# Patient Record
Sex: MALE | Race: WHITE | Employment: OTHER | URBAN - METROPOLITAN AREA
[De-identification: names, ages, dates, MRNs, and addresses within clinical notes are randomized per-mention and may not be internally consistent; named-entity substitution may affect disease eponyms.]

---

## 2017-04-18 ENCOUNTER — HOSPITAL ENCOUNTER (OUTPATIENT)
Dept: SLEEP CENTER | Age: 62
Discharge: HOME | End: 2017-04-18
Payer: MEDICARE

## 2017-04-18 VITALS
HEIGHT: 68 IN | DIASTOLIC BLOOD PRESSURE: 68 MMHG | RESPIRATION RATE: 20 BRPM | SYSTOLIC BLOOD PRESSURE: 120 MMHG | HEART RATE: 89 BPM | BODY MASS INDEX: 21.22 KG/M2 | WEIGHT: 140 LBS

## 2017-04-18 DIAGNOSIS — G47.33 OSA (OBSTRUCTIVE SLEEP APNEA): Primary | ICD-10-CM

## 2017-04-18 PROCEDURE — 95810 POLYSOM 6/> YRS 4/> PARAM: CPT

## 2017-04-18 ASSESSMENT — SLEEP AND FATIGUE QUESTIONNAIRES
NECK CIRCUMFERENCE (INCHES): 14.17
HOW LIKELY ARE YOU TO NOD OFF OR FALL ASLEEP WHILE SITTING AND READING: 3
HOW LIKELY ARE YOU TO NOD OFF OR FALL ASLEEP WHEN YOU ARE A PASSENGER IN A CAR FOR AN HOUR WITHOUT A BREAK: 2
HOW LIKELY ARE YOU TO NOD OFF OR FALL ASLEEP WHILE LYING DOWN TO REST IN THE AFTERNOON WHEN CIRCUMSTANCES PERMIT: 3
HOW LIKELY ARE YOU TO NOD OFF OR FALL ASLEEP WHILE SITTING INACTIVE IN A PUBLIC PLACE: 0
HOW LIKELY ARE YOU TO NOD OFF OR FALL ASLEEP WHILE WATCHING TV: 3
HOW LIKELY ARE YOU TO NOD OFF OR FALL ASLEEP WHILE SITTING QUIETLY AFTER LUNCH WITHOUT ALCOHOL: 0
HOW LIKELY ARE YOU TO NOD OFF OR FALL ASLEEP WHILE SITTING AND TALKING TO SOMEONE: 0
HOW LIKELY ARE YOU TO NOD OFF OR FALL ASLEEP IN A CAR, WHILE STOPPED FOR A FEW MINUTES IN TRAFFIC: 0
ESS TOTAL SCORE: 11

## 2017-05-18 ENCOUNTER — HOSPITAL ENCOUNTER (OUTPATIENT)
Age: 62
Discharge: HOME | End: 2017-05-18
Payer: MEDICARE

## 2017-05-18 ENCOUNTER — HOSPITAL ENCOUNTER (OUTPATIENT)
Dept: GENERAL RADIOLOGY | Age: 62
Discharge: HOME | End: 2017-05-18
Payer: MEDICARE

## 2017-05-18 DIAGNOSIS — R06.02 SHORTNESS OF BREATH: ICD-10-CM

## 2017-05-18 PROCEDURE — 71020 XR CHEST STANDARD TWO VW: CPT

## 2017-10-13 ENCOUNTER — HOSPITAL ENCOUNTER (OUTPATIENT)
Dept: CT IMAGING | Age: 62
Discharge: HOME | End: 2017-10-13
Payer: MEDICARE

## 2017-10-13 DIAGNOSIS — Z85.118 HISTORY OF CANCER OF UPPER LOBE BRONCHUS OR LUNG: ICD-10-CM

## 2017-10-13 PROCEDURE — 71250 CT THORAX DX C-: CPT

## 2017-10-25 ENCOUNTER — HOSPITAL ENCOUNTER (OUTPATIENT)
Dept: NUCLEAR MEDICINE | Age: 62
Discharge: HOME | End: 2017-10-25
Payer: MEDICARE

## 2017-10-25 DIAGNOSIS — R91.8 PULMONARY NODULES: ICD-10-CM

## 2017-10-25 PROCEDURE — A9552 F18 FDG: HCPCS | Performed by: INTERNAL MEDICINE

## 2017-10-25 PROCEDURE — 78815 PET IMAGE W/CT SKULL-THIGH: CPT

## 2017-10-25 PROCEDURE — 3430000000 HC RX DIAGNOSTIC RADIOPHARMACEUTICAL: Performed by: INTERNAL MEDICINE

## 2017-10-25 RX ADMIN — FLUDEOXYGLUCOSE F 18 15.87 MILLICURIE: 200 INJECTION, SOLUTION INTRAVENOUS at 16:10

## 2017-10-26 RX ORDER — FLUDEOXYGLUCOSE F 18 200 MCI/ML
15.87 INJECTION, SOLUTION INTRAVENOUS
Status: COMPLETED | OUTPATIENT
Start: 2017-10-26 | End: 2017-10-25

## 2017-11-08 ENCOUNTER — APPOINTMENT (OUTPATIENT)
Dept: GENERAL RADIOLOGY | Age: 62
DRG: 200 | End: 2017-11-08
Payer: MEDICARE

## 2017-11-08 ENCOUNTER — HOSPITAL ENCOUNTER (OUTPATIENT)
Dept: CT IMAGING | Age: 62
Discharge: HOME | DRG: 200 | End: 2017-11-08
Payer: MEDICARE

## 2017-11-08 ENCOUNTER — HOSPITAL ENCOUNTER (OUTPATIENT)
Dept: GENERAL RADIOLOGY | Age: 62
Discharge: HOME | DRG: 200 | End: 2017-11-08
Payer: MEDICARE

## 2017-11-08 ENCOUNTER — HOSPITAL ENCOUNTER (INPATIENT)
Age: 62
LOS: 1 days | Discharge: HOME | DRG: 200 | End: 2017-11-10
Attending: EMERGENCY MEDICINE | Admitting: INTERNAL MEDICINE
Payer: MEDICARE

## 2017-11-08 VITALS
TEMPERATURE: 99 F | OXYGEN SATURATION: 100 % | WEIGHT: 129.6 LBS | SYSTOLIC BLOOD PRESSURE: 135 MMHG | RESPIRATION RATE: 24 BRPM | HEART RATE: 72 BPM | DIASTOLIC BLOOD PRESSURE: 92 MMHG | BODY MASS INDEX: 19.64 KG/M2 | HEIGHT: 68 IN

## 2017-11-08 DIAGNOSIS — R91.1 LUNG NODULE: ICD-10-CM

## 2017-11-08 DIAGNOSIS — Z98.890 S/P BIOPSY: ICD-10-CM

## 2017-11-08 DIAGNOSIS — G89.18 POST-OP PAIN: ICD-10-CM

## 2017-11-08 DIAGNOSIS — R06.02 SHORTNESS OF BREATH: ICD-10-CM

## 2017-11-08 DIAGNOSIS — J44.1 COPD EXACERBATION (HCC): ICD-10-CM

## 2017-11-08 DIAGNOSIS — J93.83 OTHER PNEUMOTHORAX: Primary | ICD-10-CM

## 2017-11-08 LAB
ABSOLUTE EOS #: 0.3 K/UL (ref 0–0.4)
ABSOLUTE IMMATURE GRANULOCYTE: NORMAL K/UL (ref 0–0.3)
ABSOLUTE LYMPH #: 1.9 K/UL (ref 1–4.8)
ABSOLUTE MONO #: 1 K/UL (ref 0.1–1.3)
ANION GAP SERPL CALCULATED.3IONS-SCNC: 13 MMOL/L (ref 9–17)
BASOPHILS # BLD: 1 %
BASOPHILS ABSOLUTE: 0.1 K/UL (ref 0–0.2)
BUN BLDV-MCNC: 18 MG/DL (ref 8–23)
BUN/CREAT BLD: ABNORMAL (ref 9–20)
CALCIUM SERPL-MCNC: 9.3 MG/DL (ref 8.6–10.4)
CHLORIDE BLD-SCNC: 100 MMOL/L (ref 98–107)
CO2: 30 MMOL/L (ref 20–31)
CREAT SERPL-MCNC: 0.7 MG/DL (ref 0.7–1.2)
CULTURE: NORMAL
DIFFERENTIAL TYPE: NORMAL
DIRECT EXAM: NORMAL
EOSINOPHILS RELATIVE PERCENT: 3 %
GFR AFRICAN AMERICAN: >60 ML/MIN
GFR NON-AFRICAN AMERICAN: >60 ML/MIN
GFR SERPL CREATININE-BSD FRML MDRD: ABNORMAL ML/MIN/{1.73_M2}
GFR SERPL CREATININE-BSD FRML MDRD: ABNORMAL ML/MIN/{1.73_M2}
GLUCOSE BLD-MCNC: 115 MG/DL (ref 70–99)
HCT VFR BLD CALC: 47 % (ref 41–53)
HEMOGLOBIN: 15.9 G/DL (ref 13.5–17.5)
IMMATURE GRANULOCYTES: NORMAL %
INR BLD: 1
INR BLD: 1
LYMPHOCYTES # BLD: 21 %
Lab: NORMAL
MCH RBC QN AUTO: 29.9 PG (ref 26–34)
MCHC RBC AUTO-ENTMCNC: 33.7 G/DL (ref 31–37)
MCV RBC AUTO: 88.6 FL (ref 80–100)
MONOCYTES # BLD: 11 %
PARTIAL THROMBOPLASTIN TIME: 26.3 SEC (ref 23–31)
PARTIAL THROMBOPLASTIN TIME: 31 SEC (ref 23–31)
PDW BLD-RTO: 14.1 % (ref 11.5–14.9)
PLATELET # BLD: 319 K/UL (ref 150–450)
PLATELET # BLD: 330 K/UL (ref 150–450)
PLATELET ESTIMATE: NORMAL
PMV BLD AUTO: 6.6 FL (ref 6–12)
POTASSIUM SERPL-SCNC: 4.4 MMOL/L (ref 3.7–5.3)
PROTHROMBIN TIME: 10.5 SEC (ref 9.7–12)
PROTHROMBIN TIME: 11.1 SEC (ref 9.7–12)
RBC # BLD: 5.31 M/UL (ref 4.5–5.9)
RBC # BLD: NORMAL 10*6/UL
SEG NEUTROPHILS: 64 %
SEGMENTED NEUTROPHILS ABSOLUTE COUNT: 5.8 K/UL (ref 1.3–9.1)
SODIUM BLD-SCNC: 143 MMOL/L (ref 135–144)
SPECIMEN DESCRIPTION: NORMAL
SPECIMEN DESCRIPTION: NORMAL
STATUS: NORMAL
TROPONIN INTERP: NORMAL
TROPONIN T: <0.03 NG/ML
WBC # BLD: 9.1 K/UL (ref 3.5–11)
WBC # BLD: NORMAL 10*3/UL

## 2017-11-08 PROCEDURE — 71010 XR CHEST LIMITED: CPT

## 2017-11-08 PROCEDURE — 88333 PATH CONSLTJ SURG CYTO XM 1: CPT

## 2017-11-08 PROCEDURE — 85730 THROMBOPLASTIN TIME PARTIAL: CPT

## 2017-11-08 PROCEDURE — 0BBG3ZX EXCISION OF LEFT UPPER LUNG LOBE, PERCUTANEOUS APPROACH, DIAGNOSTIC: ICD-10-PCS | Performed by: RADIOLOGY

## 2017-11-08 PROCEDURE — 85025 COMPLETE CBC W/AUTO DIFF WBC: CPT

## 2017-11-08 PROCEDURE — 87075 CULTR BACTERIA EXCEPT BLOOD: CPT

## 2017-11-08 PROCEDURE — 87205 SMEAR GRAM STAIN: CPT

## 2017-11-08 PROCEDURE — 85610 PROTHROMBIN TIME: CPT

## 2017-11-08 PROCEDURE — 80048 BASIC METABOLIC PNL TOTAL CA: CPT

## 2017-11-08 PROCEDURE — 87116 MYCOBACTERIA CULTURE: CPT

## 2017-11-08 PROCEDURE — 2500000003 HC RX 250 WO HCPCS: Performed by: RADIOLOGY

## 2017-11-08 PROCEDURE — 2580000003 HC RX 258: Performed by: RADIOLOGY

## 2017-11-08 PROCEDURE — 6360000002 HC RX W HCPCS: Performed by: RADIOLOGY

## 2017-11-08 PROCEDURE — 7100000030 HC ASPR PHASE II RECOVERY - FIRST 15 MIN

## 2017-11-08 PROCEDURE — 99285 EMERGENCY DEPT VISIT HI MDM: CPT

## 2017-11-08 PROCEDURE — 88312 SPECIAL STAINS GROUP 1: CPT

## 2017-11-08 PROCEDURE — 85049 AUTOMATED PLATELET COUNT: CPT

## 2017-11-08 PROCEDURE — 32405 CT NEEDLE BIOPSY LUNG PERCUTANEOUS: CPT

## 2017-11-08 PROCEDURE — 36415 COLL VENOUS BLD VENIPUNCTURE: CPT

## 2017-11-08 PROCEDURE — 87102 FUNGUS ISOLATION CULTURE: CPT

## 2017-11-08 PROCEDURE — 87070 CULTURE OTHR SPECIMN AEROBIC: CPT

## 2017-11-08 PROCEDURE — 84484 ASSAY OF TROPONIN QUANT: CPT

## 2017-11-08 PROCEDURE — 87206 SMEAR FLUORESCENT/ACID STAI: CPT

## 2017-11-08 PROCEDURE — 7100000031 HC ASPR PHASE II RECOVERY - ADDTL 15 MIN

## 2017-11-08 PROCEDURE — 87176 TISSUE HOMOGENIZATION CULTR: CPT

## 2017-11-08 PROCEDURE — 88305 TISSUE EXAM BY PATHOLOGIST: CPT

## 2017-11-08 RX ORDER — IPRATROPIUM BROMIDE AND ALBUTEROL SULFATE 2.5; .5 MG/3ML; MG/3ML
2 SOLUTION RESPIRATORY (INHALATION) ONCE
Status: COMPLETED | OUTPATIENT
Start: 2017-11-09 | End: 2017-11-09

## 2017-11-08 RX ORDER — SODIUM CHLORIDE 9 MG/ML
INJECTION, SOLUTION INTRAVENOUS CONTINUOUS
Status: DISCONTINUED | OUTPATIENT
Start: 2017-11-08 | End: 2017-11-11 | Stop reason: HOSPADM

## 2017-11-08 RX ORDER — ACETAMINOPHEN 325 MG/1
650 TABLET ORAL EVERY 4 HOURS PRN
Status: DISCONTINUED | OUTPATIENT
Start: 2017-11-08 | End: 2017-11-11 | Stop reason: HOSPADM

## 2017-11-08 RX ORDER — MIDAZOLAM HYDROCHLORIDE 1 MG/ML
INJECTION INTRAMUSCULAR; INTRAVENOUS
Status: COMPLETED | OUTPATIENT
Start: 2017-11-08 | End: 2017-11-08

## 2017-11-08 RX ORDER — LIDOCAINE HYDROCHLORIDE 10 MG/ML
INJECTION, SOLUTION EPIDURAL; INFILTRATION; INTRACAUDAL; PERINEURAL
Status: COMPLETED | OUTPATIENT
Start: 2017-11-08 | End: 2017-11-08

## 2017-11-08 RX ORDER — FENTANYL CITRATE 50 UG/ML
INJECTION, SOLUTION INTRAMUSCULAR; INTRAVENOUS
Status: COMPLETED | OUTPATIENT
Start: 2017-11-08 | End: 2017-11-08

## 2017-11-08 RX ADMIN — LIDOCAINE HYDROCHLORIDE 2 ML: 10 INJECTION, SOLUTION EPIDURAL; INFILTRATION; INTRACAUDAL; PERINEURAL at 09:33

## 2017-11-08 RX ADMIN — FENTANYL CITRATE 50 MCG: 50 INJECTION INTRAMUSCULAR; INTRAVENOUS at 09:47

## 2017-11-08 RX ADMIN — LIDOCAINE HYDROCHLORIDE 2 ML: 10 INJECTION, SOLUTION EPIDURAL; INFILTRATION; INTRACAUDAL; PERINEURAL at 09:37

## 2017-11-08 RX ADMIN — SODIUM CHLORIDE: 9 INJECTION, SOLUTION INTRAVENOUS at 09:20

## 2017-11-08 RX ADMIN — MIDAZOLAM 1 MG: 1 INJECTION INTRAMUSCULAR; INTRAVENOUS at 09:47

## 2017-11-08 ASSESSMENT — PAIN - FUNCTIONAL ASSESSMENT: PAIN_FUNCTIONAL_ASSESSMENT: 0-10

## 2017-11-08 ASSESSMENT — PAIN SCALES - GENERAL
PAINLEVEL_OUTOF10: 0
PAINLEVEL_OUTOF10: 5
PAINLEVEL_OUTOF10: 0

## 2017-11-08 NOTE — Clinical Note
Patient Class: Inpatient [101]   REQUIRED: Diagnosis: Pneumothorax [831986]   Estimated Length of Stay: Estimated stay of more than 2 midnights   Future Attending Provider: Oliva Quiles [[id number]]   Telemetry Bed Required?: Yes

## 2017-11-08 NOTE — H&P
Occupational History    retired [employer]     Social History Main Topics    Smoking status: Current Every Day Smoker     Packs/day: 0.50     Years: 30.00     Last attempt to quit: 12/1/2012    Smokeless tobacco: Never Used    Alcohol use Yes      Comment: rare    Drug use: No    Sexual activity: Not Asked     Other Topics Concern    None     Social History Narrative    None        REVIEW OF SYSTEMS      Allergies   Allergen Reactions    Imdur [Isosorbide Dinitrate]     Lisinopril Swelling    Statins      Myalgia         Current Outpatient Prescriptions on File Prior to Encounter   Medication Sig Dispense Refill    VENTOLIN  (90 BASE) MCG/ACT inhaler Inhale into the lungs daily      amLODIPine (NORVASC) 10 MG tablet Take 1 tablet by mouth daily      SPIRIVA RESPIMAT 2.5 MCG/ACT AERS Inhale 1 puff into the lungs daily      metoprolol (TOPROL-XL) 100 MG XL tablet Take 100 mg by mouth daily       ASPIR-LOW 81 MG EC tablet Take 1 tablet by mouth daily      albuterol (PROVENTIL) (2.5 MG/3ML) 0.083% nebulizer solution Take by nebulization daily      nitroGLYCERIN (NITROSTAT) 0.4 MG SL tablet Place 0.4 mg under the tongue every 5 minutes as needed for Chest pain      Fluticasone Furoate-Vilanterol (BREO ELLIPTA) 100-25 MCG/INH AEPB Inhale 1 Dose into the lungs daily for 30 days. 1 each 6     No current facility-administered medications on file prior to encounter. Negative except for what is mentioned in the HPI. GENERAL PHYSICAL EXAM     Vitals: /79   Temp 98.4 °F (36.9 °C) (Temporal)   Resp 24   Ht 5' 8\" (1.727 m)   Wt 129 lb 9.6 oz (58.8 kg)   SpO2 96%   BMI 19.71 kg/m²  Body mass index is 19.71 kg/m². GENERAL APPEARANCE:   Devan Arenas is 58 y.o.,  male, not obese, nourished, conscious, alert. Does not appear to be distress or pain at this time. SKIN:  Warm, dry, no cyanosis or jaundice.               HEAD:  Normocephalic, atraumatic, no swelling or tenderness. EYES:  Pupils equal, reactive to light. EARS:  No discharge, no marked hearing loss. NOSE:  No rhinorrhea, epistaxis or septal deformity. THROAT:  Not congested. No ulceration bleeding or discharge. NECK:  No stiffness, trachea central.  No palpable masses or L.N.                 CHEST:  Symmetrical and equal on expansion. HEART:  RRR S1 > S2. No audible murmurs or gallops. LUNGS:  Equal on expansion, slightly diminished breath sounds. No adventitious sounds. ABDOMEN: Soft on palpation. No localized tenderness. No guarding or rigidity. No palpable organomegaly. LYMPHATICS:  No palpable cervical lymphadenopathy. LOCOMOTOR, BACK AND SPINE:  No tenderness or deformities. EXTREMITIES:  Symmetrical, no pedal edema. Alfas sign negative. No discoloration or ulcerations. NEUROLOGIC:  The patient is conscious, alert, oriented, No apparent focal sensory or motor deficits. PROVISIONAL DIAGNOSES / SURGERY:      CT guided Lung Bx. Cancer lung.     Patient Active Problem List    Diagnosis Date Noted    Maxillary sinus polyp 04/01/2016    CAD (coronary artery disease) 03/10/2016    Acute bronchitis 04/15/2015    Tobacco abuse 04/15/2015    Acute exacerbation of chronic obstructive pulmonary disease (COPD) ([facility] [address] 12/30/2014    Lung cancer ([facility] [address] 12/17/2012           GALLO LINDO PA-C on 11/8/2017 at 8:37 AM

## 2017-11-09 ENCOUNTER — APPOINTMENT (OUTPATIENT)
Dept: MRI IMAGING | Age: 62
DRG: 200 | End: 2017-11-09
Payer: MEDICARE

## 2017-11-09 ENCOUNTER — APPOINTMENT (OUTPATIENT)
Dept: GENERAL RADIOLOGY | Age: 62
DRG: 200 | End: 2017-11-09
Payer: MEDICARE

## 2017-11-09 ENCOUNTER — APPOINTMENT (OUTPATIENT)
Dept: NUCLEAR MEDICINE | Age: 62
DRG: 200 | End: 2017-11-09
Payer: MEDICARE

## 2017-11-09 PROBLEM — J93.83 OTHER PNEUMOTHORAX: Status: ACTIVE | Noted: 2017-11-09

## 2017-11-09 PROBLEM — J95.811 PNEUMOTHORAX AFTER BIOPSY: Status: ACTIVE | Noted: 2017-11-09

## 2017-11-09 LAB
ABSOLUTE BANDS #: 0.23 K/UL (ref 0–1)
ABSOLUTE EOS #: 0 K/UL (ref 0–0.4)
ABSOLUTE IMMATURE GRANULOCYTE: ABNORMAL K/UL (ref 0–0.3)
ABSOLUTE LYMPH #: 0.57 K/UL (ref 1–4.8)
ABSOLUTE MONO #: 0.34 K/UL (ref 0.1–1.3)
ANION GAP SERPL CALCULATED.3IONS-SCNC: 10 MMOL/L (ref 9–17)
BANDS: 2 %
BASOPHILS # BLD: 0 %
BASOPHILS ABSOLUTE: 0 K/UL (ref 0–0.2)
BUN BLDV-MCNC: 17 MG/DL (ref 8–23)
BUN/CREAT BLD: ABNORMAL (ref 9–20)
CALCIUM SERPL-MCNC: 8.9 MG/DL (ref 8.6–10.4)
CHLORIDE BLD-SCNC: 105 MMOL/L (ref 98–107)
CO2: 26 MMOL/L (ref 20–31)
CREAT SERPL-MCNC: 0.53 MG/DL (ref 0.7–1.2)
DIFFERENTIAL TYPE: ABNORMAL
DIRECT EXAM: NORMAL
DIRECT EXAM: NORMAL
EOSINOPHILS RELATIVE PERCENT: 0 %
GFR AFRICAN AMERICAN: >60 ML/MIN
GFR NON-AFRICAN AMERICAN: >60 ML/MIN
GFR SERPL CREATININE-BSD FRML MDRD: ABNORMAL ML/MIN/{1.73_M2}
GFR SERPL CREATININE-BSD FRML MDRD: ABNORMAL ML/MIN/{1.73_M2}
GLUCOSE BLD-MCNC: 154 MG/DL (ref 70–99)
HCT VFR BLD CALC: 44 % (ref 41–53)
HEMOGLOBIN: 14.8 G/DL (ref 13.5–17.5)
IMMATURE GRANULOCYTES: ABNORMAL %
LYMPHOCYTES # BLD: 5 %
Lab: NORMAL
MCH RBC QN AUTO: 29.8 PG (ref 26–34)
MCHC RBC AUTO-ENTMCNC: 33.6 G/DL (ref 31–37)
MCV RBC AUTO: 88.6 FL (ref 80–100)
MONOCYTES # BLD: 3 %
MORPHOLOGY: NORMAL
PDW BLD-RTO: 14 % (ref 11.5–14.9)
PLATELET # BLD: 283 K/UL (ref 150–450)
PLATELET ESTIMATE: ABNORMAL
PMV BLD AUTO: 6.5 FL (ref 6–12)
POTASSIUM SERPL-SCNC: 4.7 MMOL/L (ref 3.7–5.3)
RBC # BLD: 4.96 M/UL (ref 4.5–5.9)
RBC # BLD: ABNORMAL 10*6/UL
SEG NEUTROPHILS: 90 %
SEGMENTED NEUTROPHILS ABSOLUTE COUNT: 10.16 K/UL (ref 1.3–9.1)
SODIUM BLD-SCNC: 141 MMOL/L (ref 135–144)
SPECIMEN DESCRIPTION: NORMAL
SPECIMEN DESCRIPTION: NORMAL
STATUS: NORMAL
WBC # BLD: 11.3 K/UL (ref 3.5–11)
WBC # BLD: ABNORMAL 10*3/UL

## 2017-11-09 PROCEDURE — 3430000000 HC RX DIAGNOSTIC RADIOPHARMACEUTICAL: Performed by: FAMILY MEDICINE

## 2017-11-09 PROCEDURE — 2580000003 HC RX 258: Performed by: FAMILY MEDICINE

## 2017-11-09 PROCEDURE — 78597 LUNG PERFUSION DIFFERENTIAL: CPT

## 2017-11-09 PROCEDURE — 6370000000 HC RX 637 (ALT 250 FOR IP): Performed by: EMERGENCY MEDICINE

## 2017-11-09 PROCEDURE — 36415 COLL VENOUS BLD VENIPUNCTURE: CPT

## 2017-11-09 PROCEDURE — A9579 GAD-BASE MR CONTRAST NOS,1ML: HCPCS | Performed by: FAMILY MEDICINE

## 2017-11-09 PROCEDURE — 6360000002 HC RX W HCPCS: Performed by: INTERNAL MEDICINE

## 2017-11-09 PROCEDURE — 80048 BASIC METABOLIC PNL TOTAL CA: CPT

## 2017-11-09 PROCEDURE — 2060000000 HC ICU INTERMEDIATE R&B

## 2017-11-09 PROCEDURE — 6370000000 HC RX 637 (ALT 250 FOR IP): Performed by: NURSE PRACTITIONER

## 2017-11-09 PROCEDURE — 94640 AIRWAY INHALATION TREATMENT: CPT

## 2017-11-09 PROCEDURE — 85025 COMPLETE CBC W/AUTO DIFF WBC: CPT

## 2017-11-09 PROCEDURE — 94664 DEMO&/EVAL PT USE INHALER: CPT

## 2017-11-09 PROCEDURE — 6360000004 HC RX CONTRAST MEDICATION: Performed by: FAMILY MEDICINE

## 2017-11-09 PROCEDURE — 94762 N-INVAS EAR/PLS OXIMTRY CONT: CPT

## 2017-11-09 PROCEDURE — 71010 XR CHEST PORTABLE: CPT

## 2017-11-09 PROCEDURE — 6370000000 HC RX 637 (ALT 250 FOR IP): Performed by: INTERNAL MEDICINE

## 2017-11-09 PROCEDURE — 6360000002 HC RX W HCPCS: Performed by: EMERGENCY MEDICINE

## 2017-11-09 PROCEDURE — 99222 1ST HOSP IP/OBS MODERATE 55: CPT | Performed by: FAMILY MEDICINE

## 2017-11-09 PROCEDURE — 70553 MRI BRAIN STEM W/O & W/DYE: CPT

## 2017-11-09 PROCEDURE — 2580000003 HC RX 258: Performed by: INTERNAL MEDICINE

## 2017-11-09 PROCEDURE — A9540 TC99M MAA: HCPCS | Performed by: FAMILY MEDICINE

## 2017-11-09 RX ORDER — SODIUM CHLORIDE 0.9 % (FLUSH) 0.9 %
10 SYRINGE (ML) INJECTION PRN
Status: DISCONTINUED | OUTPATIENT
Start: 2017-11-09 | End: 2017-11-10 | Stop reason: HOSPADM

## 2017-11-09 RX ORDER — METHYLPREDNISOLONE SODIUM SUCCINATE 40 MG/ML
40 INJECTION, POWDER, LYOPHILIZED, FOR SOLUTION INTRAMUSCULAR; INTRAVENOUS EVERY 8 HOURS
Status: DISCONTINUED | OUTPATIENT
Start: 2017-11-09 | End: 2017-11-10

## 2017-11-09 RX ORDER — ACETAMINOPHEN 325 MG/1
650 TABLET ORAL EVERY 4 HOURS PRN
Status: DISCONTINUED | OUTPATIENT
Start: 2017-11-09 | End: 2017-11-10 | Stop reason: HOSPADM

## 2017-11-09 RX ORDER — METOPROLOL SUCCINATE 100 MG/1
100 TABLET, EXTENDED RELEASE ORAL DAILY
Status: DISCONTINUED | OUTPATIENT
Start: 2017-11-09 | End: 2017-11-10 | Stop reason: HOSPADM

## 2017-11-09 RX ORDER — LEVALBUTEROL 1.25 MG/.5ML
1.25 SOLUTION, CONCENTRATE RESPIRATORY (INHALATION) EVERY 4 HOURS
Status: DISCONTINUED | OUTPATIENT
Start: 2017-11-09 | End: 2017-11-10 | Stop reason: HOSPADM

## 2017-11-09 RX ORDER — DIAPER,BRIEF,INFANT-TODD,DISP
EACH MISCELLANEOUS 2 TIMES DAILY
Status: DISCONTINUED | OUTPATIENT
Start: 2017-11-09 | End: 2017-11-10 | Stop reason: HOSPADM

## 2017-11-09 RX ORDER — METHYLPREDNISOLONE SODIUM SUCCINATE 125 MG/2ML
125 INJECTION, POWDER, LYOPHILIZED, FOR SOLUTION INTRAMUSCULAR; INTRAVENOUS ONCE
Status: COMPLETED | OUTPATIENT
Start: 2017-11-09 | End: 2017-11-09

## 2017-11-09 RX ORDER — AMLODIPINE BESYLATE 10 MG/1
10 TABLET ORAL DAILY
Status: DISCONTINUED | OUTPATIENT
Start: 2017-11-09 | End: 2017-11-10 | Stop reason: HOSPADM

## 2017-11-09 RX ORDER — SODIUM CHLORIDE 9 MG/ML
INJECTION, SOLUTION INTRAVENOUS CONTINUOUS
Status: DISCONTINUED | OUTPATIENT
Start: 2017-11-09 | End: 2017-11-10

## 2017-11-09 RX ORDER — SODIUM CHLORIDE 0.9 % (FLUSH) 0.9 %
10 SYRINGE (ML) INJECTION EVERY 12 HOURS SCHEDULED
Status: DISCONTINUED | OUTPATIENT
Start: 2017-11-09 | End: 2017-11-10 | Stop reason: HOSPADM

## 2017-11-09 RX ORDER — NICOTINE 21 MG/24HR
1 PATCH, TRANSDERMAL 24 HOURS TRANSDERMAL DAILY
Status: DISCONTINUED | OUTPATIENT
Start: 2017-11-09 | End: 2017-11-10 | Stop reason: HOSPADM

## 2017-11-09 RX ORDER — ONDANSETRON 2 MG/ML
4 INJECTION INTRAMUSCULAR; INTRAVENOUS EVERY 6 HOURS PRN
Status: DISCONTINUED | OUTPATIENT
Start: 2017-11-09 | End: 2017-11-10 | Stop reason: HOSPADM

## 2017-11-09 RX ADMIN — HYDROCORTISONE: 1 CREAM TOPICAL at 07:54

## 2017-11-09 RX ADMIN — LEVALBUTEROL 1.25 MG: 1.25 SOLUTION, CONCENTRATE RESPIRATORY (INHALATION) at 05:30

## 2017-11-09 RX ADMIN — SODIUM CHLORIDE: 9 INJECTION, SOLUTION INTRAVENOUS at 18:46

## 2017-11-09 RX ADMIN — METHYLPREDNISOLONE SODIUM SUCCINATE 125 MG: 125 INJECTION, POWDER, FOR SOLUTION INTRAMUSCULAR; INTRAVENOUS at 01:29

## 2017-11-09 RX ADMIN — METHYLPREDNISOLONE SODIUM SUCCINATE 40 MG: 40 INJECTION, POWDER, FOR SOLUTION INTRAMUSCULAR; INTRAVENOUS at 15:56

## 2017-11-09 RX ADMIN — LEVALBUTEROL 1.25 MG: 1.25 SOLUTION, CONCENTRATE RESPIRATORY (INHALATION) at 07:49

## 2017-11-09 RX ADMIN — SODIUM CHLORIDE: 9 INJECTION, SOLUTION INTRAVENOUS at 03:29

## 2017-11-09 RX ADMIN — LEVALBUTEROL 1.25 MG: 1.25 SOLUTION, CONCENTRATE RESPIRATORY (INHALATION) at 20:56

## 2017-11-09 RX ADMIN — HYDROCORTISONE: 1 CREAM TOPICAL at 21:00

## 2017-11-09 RX ADMIN — HYDROCORTISONE: 1 CREAM TOPICAL at 02:51

## 2017-11-09 RX ADMIN — METHYLPREDNISOLONE SODIUM SUCCINATE 40 MG: 40 INJECTION, POWDER, FOR SOLUTION INTRAMUSCULAR; INTRAVENOUS at 07:54

## 2017-11-09 RX ADMIN — METOPROLOL SUCCINATE 100 MG: 100 TABLET, EXTENDED RELEASE ORAL at 07:54

## 2017-11-09 RX ADMIN — TIOTROPIUM BROMIDE 18 MCG: 18 CAPSULE ORAL; RESPIRATORY (INHALATION) at 07:54

## 2017-11-09 RX ADMIN — IPRATROPIUM BROMIDE AND ALBUTEROL SULFATE 2 AMPULE: .5; 3 SOLUTION RESPIRATORY (INHALATION) at 00:07

## 2017-11-09 RX ADMIN — LEVALBUTEROL 1.25 MG: 1.25 SOLUTION, CONCENTRATE RESPIRATORY (INHALATION) at 11:32

## 2017-11-09 RX ADMIN — Medication 5.4 MILLICURIE: at 10:49

## 2017-11-09 RX ADMIN — GADOTERIDOL 13 ML: 279.3 INJECTION, SOLUTION INTRAVENOUS at 15:36

## 2017-11-09 RX ADMIN — LEVALBUTEROL 1.25 MG: 1.25 SOLUTION, CONCENTRATE RESPIRATORY (INHALATION) at 16:22

## 2017-11-09 RX ADMIN — Medication 10 ML: at 10:49

## 2017-11-09 RX ADMIN — AMLODIPINE BESYLATE 10 MG: 10 TABLET ORAL at 07:54

## 2017-11-09 ASSESSMENT — ENCOUNTER SYMPTOMS
COUGH: 0
RHINORRHEA: 0
SHORTNESS OF BREATH: 1
NAUSEA: 0
VOMITING: 0
ABDOMINAL PAIN: 0

## 2017-11-09 ASSESSMENT — PAIN DESCRIPTION - PROGRESSION

## 2017-11-09 ASSESSMENT — PAIN DESCRIPTION - FREQUENCY: FREQUENCY: INTERMITTENT

## 2017-11-09 ASSESSMENT — PAIN DESCRIPTION - PAIN TYPE: TYPE: ACUTE PAIN

## 2017-11-09 ASSESSMENT — PAIN DESCRIPTION - LOCATION: LOCATION: RIB CAGE

## 2017-11-09 ASSESSMENT — PAIN DESCRIPTION - ORIENTATION: ORIENTATION: LEFT

## 2017-11-09 ASSESSMENT — PAIN SCALES - GENERAL
PAINLEVEL_OUTOF10: 2
PAINLEVEL_OUTOF10: 0

## 2017-11-09 ASSESSMENT — PAIN DESCRIPTION - DESCRIPTORS: DESCRIPTORS: DISCOMFORT

## 2017-11-09 NOTE — PROGRESS NOTES
Daily Protein (g): 74-86 gm    Estimated Intake vs Estimated Needs: Intake Less Than Needs    Nutrition Risk Level: Moderate, High    Nutrition Interventions:   Continue current diet, Start ONS  Continued Inpatient Monitoring    Nutrition Evaluation:   · Evaluation: Goals set   · Goals: Adequate nutrition intake or provision    · Monitoring: Meal Intake, Supplement Intake, Diet Tolerance, Pertinent Labs, Weight, Skin Integrity    See Adult Nutrition Doc Flowsheet for more detail. Sofia Cook R.D. LLULY.   Clinical Dietitian  Pager: [phone]

## 2017-11-09 NOTE — PROGRESS NOTES
Pt admitted to [id number]. cardiac monitor, nibp and sao2 applied. Pt oriented to surroundings. Vs obtained. Routine of the unit and call light explained.  Belongings in closet

## 2017-11-09 NOTE — FLOWSHEET NOTE
Visit by Chuckie Mendez     11/09/17 3026   Encounter Summary   Services provided to: Patient   Referral/Consult From: Rounding   Continue Visiting (11-9-17)   Complexity of Encounter Low   Length of Encounter 15 minutes   Routine   Type Follow up   Spiritual/Yazidi   Type Spiritual support   Intervention Prayer

## 2017-11-09 NOTE — ED NOTES
Bedside report given to [facility]. Pt transported on monitor with RN and paramedic.       [facility]  11/09/17 7224

## 2017-11-09 NOTE — H&P
Current Facility-Administered Medications   Medication Dose Route Frequency Provider Last Rate Last Dose    sodium chloride flush 0.9 % injection 10 mL  10 mL Intravenous 2 times per day Paulina Charles MD        sodium chloride flush 0.9 % injection 10 mL  10 mL Intravenous PRN Paulina Charles MD        acetaminophen (TYLENOL) tablet 650 mg  650 mg Oral Q4H PRN Paulina Charles MD        magnesium hydroxide (MILK OF MAGNESIA) 400 MG/5ML suspension 30 mL  30 mL Oral Daily PRN Paulina Charles MD        [facility] injection 4 mg  4 mg Intravenous Q6H PRN Paulina Charles MD        hydrocortisone 1 % cream   Topical BID Rico LEONID Garcia DO        levalbuterol (XOPENEX) nebulizer solution 1.25 mg  1.25 mg Nebulization Q4H Paulina Charles MD   1.25 mg at 11/09/17 1132    0.9 % sodium chloride infusion   Intravenous Continuous Paulina Charles MD 70 mL/hr at 11/09/17 0329      amLODIPine (NORVASC) tablet 10 mg  10 mg Oral Daily Paulina Charles MD   10 mg at 11/09/17 0754    metoprolol succinate (TOPROL XL) extended release tablet 100 mg  100 mg Oral Daily Paulina Charles MD   100 mg at 11/09/17 0754    tiotropium (SPIRIVA) inhalation capsule 18 mcg  1 capsule Inhalation Daily Paulina Charles MD   18 mcg at 11/09/17 0754    methylPREDNISolone sodium (SOLU-MEDROL) injection 40 mg  40 mg Intravenous Q8H Paulina Charles MD   40 mg at 11/09/17 0754    sodium chloride flush 0.9 % injection 10 mL  10 mL Intravenous PRN Carlin Anderson MD   10 mL at 11/09/17 1049     Facility-Administered Medications Ordered in Other Encounters   Medication Dose Route Frequency Provider Last Rate Last Dose    0.9 % sodium chloride infusion   Intravenous Continuous John Padgett MD   Stopped at 11/08/17 1015    acetaminophen (TYLENOL) tablet 650 mg  650 mg Oral Q4H PRN John Padgett MD          PULMONARY  CONSULT NOTE      Date of Admission: 11/8/2017 11:02 PM    Referring Physician: * No referring provider recorded for this case *  PCP: Carlin Anderson MD     History of Present Illness: Fraser Bloch is a 58 y.o. White   male who presents to ED with increasing SOB after undergoing a left lung biopsy earlier in the day. Small left apical PTX found on CXR stable this morning with 1.4 cm separation. Hx COPD on home 02, Hx rt lung ca s/p rt upper lobectomy in 2012 - now with new lung nodules    Problem:  Principal Problem: post procedure PTX    PMH:   Past Medical History:   Diagnosis Date    Arthritis     CAD (coronary artery disease)     Cataract     COPD (chronic obstructive pulmonary disease) ([facility] [address]     Emphysema    Hypertension     Inguinal hernia     right    Lung cancer ([facility] [address] 12/17/2012    Stage IA [place]: robotic RUL    MI (myocardial infarction)     2009    Oxygen dependent     O2 AT HS @2 L PER CANNULA.    SOB (shortness of breath) on exertion     Vascular disease     Wears glasses        PSH:   Past Surgical History:   Procedure Laterality Date    BACK SURGERY  cervical fusion    CATARACT REMOVAL      Rt. eye    COLONOSCOPY      CORONARY ANGIOPLASTY WITH STENT PLACEMENT  2009    had stent x1 before 2009 as well. ( total of 2 stents)    EYE SURGERY      rt. cataract extracted with IOL    INGUINAL HERNIA REPAIR  2012    INGUINAL HERNIA REPAIR Right 8/28/14    Dr Janeth Landeros Right 8/28/14    excision of anal lesion    LOBECTOMY  12/17/12    RUL robotic    NECK SURGERY      OTHER SURGICAL HISTORY  2002    neck fusion    UPPER GASTROINTESTINAL ENDOSCOPY  6/2/14    polyp biopsy, Dr Tl Worley       Allergies:    Allergies   Allergen Reactions    Imdur [Isosorbide Dinitrate]     Lisinopril Swelling    Statins      Myalgia         Home Meds:  Prescriptions Prior to Admission: VENTOLIN  (90 BASE) MCG/ACT inhaler, Inhale into the lungs daily  amLODIPine (NORVASC) 10 MG tablet, Take 1 tablet by mouth daily  SPIRIVA RESPIMAT 2.5 MCG/ACT AERS, Inhale 1 puff into the lungs daily  metoprolol (TOPROL-XL) rate and rhythm, S1, S2 normal, no murmur, click, rub or gallop  Abdomen: soft, non-tender; bowel sounds normal; no masses,  no organomegaly  Extremities: extremities normal, atraumatic, no cyanosis or edema  Skin: skin color, texture, turgor normal. No rashes or lesions  Neurologic: Grossly normal    Recent labs, Imaging studies reviewed    Data ReviewCBC:   Recent Labs      11/08/17   0823  11/08/17   2315  11/09/17   0543   WBC   --   9.1  11.3*   RBC   --   5.31  4.96   HGB   --   15.9  14.8   HCT   --   47.0  44.0   PLT  319  330  283     BMP:   Recent Labs      11/08/17   2315  11/09/17   0543   GLUCOSE  115*  154*   NA  143  141   K  4.4  4.7   BUN  18  17   CREATININE  0.70  0.53*   CALCIUM  9.3  8.9     ABGs: No results for input(s): PHART, PO2ART, LHT6TES, MPV4SRD, BEART, H9KGWDBP, WFM4AFM in the last 72 hours.    PT/INR:  No results found for: PTINR      ASSESSMENT / PLAN:    Dx pneumothorax after lung biopsy       Chronic hypoxic respiratory failure       COPD       Hx lung ca s/p rt upper lobectomy in 2012       New lung nodules  Continue steroids, BD, add nicotine patch  CXR in am    Plan of care discussed with Dr Richard Plata  Electronically signed by Mary Jo Lei on 11/09/17

## 2017-11-09 NOTE — ED PROVIDER NOTES
[facility] ICU  eMERGENCY dEPARTMENT eNCOUnter   Attending Attestation     Pt Name: Nlei Ladd  MRN: [id number]  [place] 1955  Date of evaluation: 11/8/17       Neli Ladd is a 58 y.o. male who presents with Shortness of Breath      MDM:     S/p post lung bx today, small ptx and hemothorax noted , increased sob this evening. Will screen for worsening ptx vs copd exacerbation. Pt is on high flow oxygen and has remained awake and alert. Will have pt closely monitored for resp distress or changes in mental status. Pt not exhibiting any signs of hypercapneic resp failure. Vitals:   Vitals:    11/09/17 0330 11/09/17 0345 11/09/17 0400 11/09/17 0415   BP: 123/88 135/86 112/73 112/73   Pulse: 100 83 82 85   Resp: 23 18 18 20   Temp:   97.8 °F (36.6 °C)    TempSrc:   Oral    SpO2:   100% 100%   Weight:       Height:             I personally evaluated and examined the patient in conjunction with the resident and agree with the assessment, treatment plan, and disposition of the patient as recorded by the resident. I performed a history and physical examination of the patient and discussed management with the resident. I reviewed the residents note and agree with the documented findings and plan of care. Any areas of disagreement are noted on the chart. I was personally present for the key portions of any procedures. I have documented in the chart those procedures where I was not present during the key portions. I have personally reviewed all images and agree with the resident's interpretation. I have reviewed the emergency nurses triage note. I agree with the chief complaint, past medical history, past surgical history, allergies, medications, social and family history as documented unless otherwise noted.     Marlon Fenton MD  Attending Emergency Physician            Marlon Fenton MD  11/09/17 4546

## 2017-11-09 NOTE — PROGRESS NOTES
Breath Sounds: slight wheeze  RR[de-identified] 24  Pulse Sat: [email or web address]  Home Meds: unit dose proventil PRN, spiriva QD.  Albuterol mdi prn    · Bronchodilator assessment at level: 3  · []    Home Level  BRONCHODILATOR ASSESSMENT SCORE  Score 0 1 2 3 4 5   Breath Sounds   []  Patient Baseline []  No Wheeze good aeration [x]  Faint, scattered wheezing, good aeration []  Expiratory Wheezing and or moderately diminished []  Insp/Exp wheeze and/or very diminished []  Insp/Exp and/ or marked distress   Respiratory Rate   []  Patient Baseline []  Less than 20 []  Less than 20 [x]  20-25 []  Greater than 25 []  Greater than 25   Peak flow % of Pred or PB [x]  NA   []  Greater than 90%  []  81-90% []  71-80% []  Less than or equal to 70%  or unable to perform []  Unable due to Respiratory Distress   Dyspnea re []  Patient Baseline []  No SOB []  No SOB [x]  SOB on exertion []  SOB min activity []  At rest/acute   e FEV% Predicted       [x]  NA []  Above 69%  []  Unable []  Above 60-69%  []  Unable []  Above 50-59%  []  Unable []  Above 35-49%  []  Unable []  Less than 35%  []  Unable

## 2017-11-09 NOTE — CARE COORDINATION
CASE MANAGEMENT NOTE:    Admission Date:  11/8/2017 Roxi Pepe is a 58 y.o.  male    Admitted for : Pneumothorax [J93.9]  Pneumothorax [J93.9]    Met with:  Patient    PCP:  Dr. Edy Chowdhury:  Fer       Current Residence/ Living Arrangements:  independently at home             Current Services PTA:  Pulm Rehab    Is patient agreeable to VNS: No    Freedom of choice provided: NA         VNS chosen:  NA    DME:  none    Home Oxygen: yes at night    Nebulizer: No    Supplier: [employer]    Potential Assistance Needed: No    SNF needed: No    Pharm[facility]:  [facility]       Does Patient want to use MEDS to BEDS? No    Family Members/Caregivers that pt would like involved in their care:    Yes    If yes, list name here:  Terrance Dumont    Transportation Provider:  Family                      Discharge Plan:  11/9/17 [facility] Pt is from home with his wife in a one story home he uses no dme and denies need for vns pt has home oxygen from [facility] plan is to discharge to home will follow for needs . //tv               Readmission Risk              Readmission Risk:        14.75       Age 72 or Greater:  0    Admitted from SNF or Requires Paid or Family Care:  0    Currently has CHF,COPD,ARF,CRI,or is on dialysis:  4    Takes more than 5 Prescription Medications:  4    Takes Digoxin,Insulin,Anticoagulants,Narcotics or ASA/Plavix:  [address] [place] [address] in Past 12 Months:  0    On Disability:  3    Patient Considers own Health:  3.75          Electronically signed by:  Durga Singh RN on 11/9/2017 at 1:18 PM

## 2017-11-09 NOTE — CARE COORDINATION
[address] Transitions Interview     2017    Patient: Driscilla Dance Patient : 1955   MRN: [id number]  Reason for Admission: There are no discharge diagnoses documented for the most recent discharge. RARS: Helen  14.75       Patient out of room when writer visited, will continue to follow//JU      Readmission Risk  Patient Active Problem List   Diagnosis    [facility]    Acute exacerbation of chronic obstructive pulmonary disease (COPD) ([facility] [facility]    Acute bronchitis    CAD (coronary artery disease)    Maxillary sinus polyp    Other pneumothorax    Shortness of breath       Inpatient Assessment  Care Transitions Summary    Care Transitions Inpatient Review  Medication Review  Are you able to afford your medications?:  Yes  Housing Review  Are you an active caregiver in your home?:  No  Does the person that you care for see a [facility] PCP?:  No  Social Support  Durable Medical Equipment  Functional Review  Hearing and Vision  Care Transitions Interventions         Follow Up  Future Appointments  Date Time Provider Vanesa Ferreira   2017 12:00 PM [facility]  [address] Maintenance  There are no preventive care reminders to display for this patient.     Krissy Winter RN

## 2017-11-09 NOTE — PLAN OF CARE
Problem: Risk for Impaired Skin Integrity  Goal: Tissue integrity - skin and mucous membranes  Structural intactness and normal physiological function of skin and  mucous membranes. Outcome: Met This Shift  No new skin breakdown noted. Problem: Falls - Risk of  Goal: Absence of falls  Outcome: Met This Shift  No falls this shift.     Problem: Breathing Pattern - Ineffective:  Goal: Ability to achieve and maintain a regular respiratory rate will improve  Ability to achieve and maintain a regular respiratory rate will improve  Outcome: Ongoing  Pt remains on High Flow NC sustain  Saturation

## 2017-11-09 NOTE — FLOWSHEET NOTE
11/09/17 0857   Encounter Summary   Services provided to: Patient   Referral/Consult From: Rounding   Continue Visiting (11/9/17)   Complexity of Encounter Low   Length of Encounter 15 minutes   Routine   Type Initial   Sacraments   Sacrament of Sick-Anointing Anointed  (Fr. Whittaker Lax 11/9/17)

## 2017-11-09 NOTE — PLAN OF CARE
Problem: Risk for Impaired Skin Integrity  Goal: Tissue integrity - skin and mucous membranes  Structural intactness and normal physiological function of skin and  mucous membranes. Outcome: Ongoing  Patient with small puncture site in left side/flank due to lung biopsy 11/8/17. Patient with rash on upper back. No other skin issues noted. Will continue to monitor. Problem: Falls - Risk of  Goal: Absence of falls  Outcome: Ongoing  Patient safety maintained. Bed locked and lowest position. Call light within reach. Hourly rounding performed. No falls or injuries. Will continue to monitor. Problem: Breathing Pattern - Ineffective:  Goal: Ability to achieve and maintain a regular respiratory rate will improve  Ability to achieve and maintain a regular respiratory rate will improve   Outcome: Ongoing  Patient maintaining oxygen saturation above 95% on 2L via NC. No S/S of respiratory difficulty at rest. Noticeable dyspnea with exertion.

## 2017-11-09 NOTE — ED PROVIDER NOTES
abdominal pain, nausea and vomiting. Genitourinary: Negative for difficulty urinating and dysuria. Musculoskeletal: Negative for gait problem and neck pain. Skin: Negative for rash and wound. Neurological: Negative for dizziness and numbness. PHYSICAL EXAM   (up to 7 for level 4, 8 or more for level 5)      INITIAL VITALS:   /83   Pulse 92   Temp 97.9 °F (36.6 °C) (Axillary)   Resp 20   Ht 5' 8\" (1.727 m)   Wt 129 lb (58.5 kg)   SpO2 99%   BMI 19.61 kg/m²     Physical Exam   Constitutional: He is oriented to person, place, and time. He appears well-developed and well-nourished. HENT:   Head: Normocephalic and atraumatic. Mouth/Throat: Oropharynx is clear and moist.   Eyes: Conjunctivae and EOM are normal. Pupils are equal, round, and reactive to light. Neck: Neck supple. Cardiovascular: Regular rhythm, normal heart sounds and intact distal pulses. Tachycardia present. Exam reveals no gallop and no friction rub. No murmur heard. Pulses:       Radial pulses are 2+ on the right side, and 2+ on the left side. Pulmonary/Chest: Breath sounds normal. Accessory muscle usage present. He is in respiratory distress. He has no wheezes. He has no rales. Decreased but equal breath sounds bilaterally   Abdominal: Soft. He exhibits no distension. There is no tenderness. There is no rebound and no guarding. Musculoskeletal: He exhibits no edema, tenderness or deformity. Neurological: He is alert and oriented to person, place, and time. No cranial nerve deficit. He exhibits normal muscle tone. Skin: Skin is warm and dry. No rash noted. He is not diaphoretic.        DIFFERENTIAL  DIAGNOSIS     PLAN (LABS / IMAGING / EKG):  Orders Placed This Encounter   Procedures    XR CHEST (SINGLE VIEW FRONTAL)    CBC Auto Differential    Basic Metabolic Prof    APTT    PT    Troponin    Continuous Pulse Oximetry    Inpatient consult to Primary Care Provider    Inpatient consult to sec   PT   Result Value Ref Range    Protime 10.5 9.7 - 12.0 sec    INR 1.0    Troponin   Result Value Ref Range    Troponin T <0.03 <0.03 ng/mL    Troponin Interp             IMPRESSION: Pneumothorax, respiratory distress, COPD    RADIOLOGY:  Xr Chest (single View Frontal)    Result Date: 11/8/2017  EXAMINATION: SINGLE VIEW OF THE CHEST 11/8/2017 11:32 pm COMPARISON: November 8, 2017 at 1434 hours HISTORY: ORDERING SYSTEM PROVIDED HISTORY: s/p needle bx of lung today, known hemo/pneumothorax, worsening sob TECHNOLOGIST PROVIDED HISTORY: Reason for exam:->s/p needle bx of lung today, known hemo/pneumothorax, worsening sob Ordering Physician Provided Reason for Exam: s/p needle bx of lung today. known hemo/pneumothorax, worsening sob Acuity: Acute Type of Exam: Initial Additional signs and symptoms: Worsening shortness of breath. Needle bx of left lung today. Known hemo/pneumothorax Relevant Medical/Surgical History: Worsening shortness of breath. Needle bx of left lung today. Known hemo/pneumothorax FINDINGS: Tiny left apical pneumothorax with approximately 1.4 cm of pleural separation has not progressed and may be slightly smaller than immediate prior exam, allowing for slight difference in imaging technique angles. The remainder the examination is stable. Cardiomediastinal silhouette is within normal limits. No focal lung consolidation. Minimal blunting of left costophrenic angle. Stable osseous structures. Stable small left apical pneumothorax.   Stable examination     Xr Chest (single View Frontal)    Result Date: 11/8/2017  EXAMINATION: SINGLE VIEW OF THE CHEST 11/8/2017 12:44 pm COMPARISON: 11/08/2017 HISTORY: ORDERING SYSTEM PROVIDED HISTORY: S/P biopsy TECHNOLOGIST PROVIDED HISTORY: Reason for exam:->post lung biopsy per dr James Carson Ordering Physician Provided Reason for Exam: S/p lung biopsy Acuity: Acute Type of Exam: Ongoing Additional signs and symptoms: S/p lung biopsy FINDINGS: There is now a thoracic aorta. Very small left pneumothorax, slightly decreased since prior study. Ct Chest Wo Contrast    Result Date: 10/13/2017  EXAMINATION: CT OF THE CHEST WITHOUT CONTRAST, 10/13/2017 10:38 am TECHNIQUE: CT of the chest was performed without the administration of intravenous contrast. Multiplanar reformatted images are provided for review. Dose modulation, iterative reconstruction, and/or weight based adjustment of the mA/kV was utilized to reduce the radiation dose to as low as reasonably achievable. COMPARISON: September 14, 2015 HISTORY: ORDERING SYSTEM PROVIDED HISTORY: History of cancer of upper lobe bronchus or lung TECHNOLOGIST PROVIDED HISTORY: Ordering Physician Provided Reason for Exam: FOLLOW UP LUNG CANCER, HISTORY OF COPD, EMPHYSEMA Acuity: Chronic Type of Exam: Ongoing FINDINGS: Mediastinum: The heart size is normal.  There is no evidence of pericardial effusion. The aorta demonstrates normal course and caliber. There are no pathologically enlarged intrathoracic or axillary lymph nodes. Lungs/pleura: There is nonspecific debris within the distal trachea. The central airways are patent. The lungs are emphysematous. Scarring within the right lower lobe is stable. Volume loss right upper lobe secondary to partial lung resection. Within the posterior segment left upper lobe there is demonstration of a new nodule with irregular margins measuring 10 x 7.5 mm. Refer to image #52. Scarring medial basilar segment left lower lobe. 1 mm subpleural nodule right lower lobe on image #92 is stable. Upper Abdomen: The upper abdomen demonstrates stable right adrenal nodule measuring 2.3 cm most consistent with adrenal adenoma. Soft Tissues/Bones: No suspicious lytic or blastic osseous lesion. 1. Status post partial right lung resection with no evidence of local recurrence. 2. A new, indeterminate 10 x 7.5 mm pulmonary nodule posterior segment left upper lobe.   Refer to Fleischner Criteria intravenously for moderate sedation monitored under my direction. Total intraservice time of sedation was 45 minutes. The patient's vital signs were monitored throughout the procedure and recorded in the patient's medical record by the nurse. TECHNIQUE AND FINDINGS: Informed consent was obtained after a detailed discussion about the procedure including the risk, benefits, and alternatives. Universal protocol was followed. Axial images were obtained through the lung and a suitable skin site was prepped and draped in sterile fashion. Local anesthesia was achieved with 1% lidocaine. An approximately 7 mm left upper lobe lung nodule was targeted for biopsy. The slightly larger perihilar nodule shown on the recent PET-CT is somewhat ill-defined and surrounded by vessels which would make biopsy much more high risk. With CT for localization a 19 gauge needle was advanced to the periphery of the nodule. CT images show safe tract with the needle tip at the periphery of the nodule. Approximately 4 core biopsy specimens were obtained in coaxial fashion with a 20 gauge Temno needle and submitted to the pathologist for review. Preliminary evaluation confirms adequate specimens for assessment. A needle aspiration biopsy was obtained with the guide needle. Sterile dressing was applied. Postprocedure images show small amount of hemorrhage adjacent to the nodule and regional soft tissue emphysema in the chest wall. No significant pneumothorax is seen. The patient left the department stable condition. Dose modulation, iterative reconstruction, and/or weight based adjustment of the mA/kV was utilized to reduce the radiation dose to as low as reasonably achievable. Successful CT guided core biopsy of a subcentimeter left upper lobe lung nodule.      Pet Ct Skull Base To Mid Thigh    Result Date: 10/26/2017  EXAMINATION: WHOLE BODY PET/CT 10/26/2017 TECHNIQUE: PET-CT The patient received 15.87 mCi of F-18-FDG intravenously, at blood sugar level of 79 mg per deciliter. After approximately 1 hour post injection of the tracer, sequential low-dose CT without intravenous contrast enhancement and positron emission tomography (PET) were performed from base of the skull to the upper thighs. The registered, noncontrast-enhanced CT, non-attenuation-corrected PET and the attenuation-corrected PET images were acquired and reconstructed into axial coronal and sagittal projections for interpretation. Please note that the CT portion of the present study was performed for the attenuation correction and anatomical correlation of the findings on the PET images only. COMPARISON: Comparison is made with previous examination dated December 6, 2012 HISTORY: Patient is a 70-year-old male follow PET-CT for suspicious lung nodules Patient is status post right upper lobectomy in 2013. FINDINGS: Since the previous examination there has been right upper lobectomy. The right upper lobe hypermetabolic nodule has been removed. However, there are two hypermetabolic nodules developed in the left parahilar area. There is a 7.5 mm hypermetabolic nodule developed posteriorly in the left upper lobe showing SUV maximum activity of 2.7. There is apparently 6-7 mm hypermetabolic lymph node in the left gabriel showing SUV maximum activity of 4.1. The second nodule noted cephalad to this in the posterior aspect of the left upper lobe measuring approximately 11 mm with SUV maximum activity of 6.5. The nodules are highly suspicious for metastatic disease process. There are no recurrent hypermetabolic lesions identified in the right lung. Right upper lobectomy with slight volume loss of the right lung noted. There is mild increased activity in the vocalis muscles slightly more on the left side likely from inadequate relaxation. There is no distinct lesion or nodule identified in the vocal cords.  Otherwise, PET/CT demonstrated normal physiological distribution of PROVIDER  IP CONSULT TO PULMONOLOGY    CRITICAL CARE:  None    FINAL IMPRESSION      1. Other pneumothorax    2. Shortness of breath    3.  COPD exacerbation ([facility] [address]          DISPOSITION / PLAN     DISPOSITION Admitted    PATIENT REFERRED TO:  [facility] [place], [address]  [address]  [phone]            DISCHARGE MEDICATIONS:  New Prescriptions    No medications on file       Bobbi Quesada DO  Emergency Medicine Resident    (Please note that portions of this note were completed with a voice recognition program.  Efforts were made to edit the dictations but occasionally words are mis-transcribed.)      Bobbi Quesada DO  11/09/17 8551

## 2017-11-09 NOTE — PROGRESS NOTES
Pt taken off HFNC  Placed on NC 5l/m  100%  Decreased to 4l/m  Tolerating well  Electronically signed by isa dotson RCP on 11/9/2017 at 7:59 AM

## 2017-11-10 ENCOUNTER — APPOINTMENT (OUTPATIENT)
Dept: GENERAL RADIOLOGY | Age: 62
DRG: 200 | End: 2017-11-10
Payer: MEDICARE

## 2017-11-10 VITALS
HEIGHT: 68 IN | RESPIRATION RATE: 16 BRPM | SYSTOLIC BLOOD PRESSURE: 144 MMHG | DIASTOLIC BLOOD PRESSURE: 88 MMHG | HEART RATE: 96 BPM | WEIGHT: 135.58 LBS | OXYGEN SATURATION: 99 % | TEMPERATURE: 97.3 F | BODY MASS INDEX: 20.55 KG/M2

## 2017-11-10 LAB
ABSOLUTE EOS #: 0 K/UL (ref 0–0.4)
ABSOLUTE IMMATURE GRANULOCYTE: ABNORMAL K/UL (ref 0–0.3)
ABSOLUTE LYMPH #: 1.09 K/UL (ref 1–4.8)
ABSOLUTE MONO #: 0.62 K/UL (ref 0.1–1.3)
ANION GAP SERPL CALCULATED.3IONS-SCNC: 12 MMOL/L (ref 9–17)
BASOPHILS # BLD: 0 %
BASOPHILS ABSOLUTE: 0 K/UL (ref 0–0.2)
BUN BLDV-MCNC: 14 MG/DL (ref 8–23)
BUN/CREAT BLD: ABNORMAL (ref 9–20)
CALCIUM SERPL-MCNC: 9.1 MG/DL (ref 8.6–10.4)
CHLORIDE BLD-SCNC: 103 MMOL/L (ref 98–107)
CO2: 26 MMOL/L (ref 20–31)
CREAT SERPL-MCNC: 0.54 MG/DL (ref 0.7–1.2)
DIFFERENTIAL TYPE: ABNORMAL
EOSINOPHILS RELATIVE PERCENT: 0 %
GFR AFRICAN AMERICAN: >60 ML/MIN
GFR NON-AFRICAN AMERICAN: >60 ML/MIN
GFR SERPL CREATININE-BSD FRML MDRD: ABNORMAL ML/MIN/{1.73_M2}
GFR SERPL CREATININE-BSD FRML MDRD: ABNORMAL ML/MIN/{1.73_M2}
GLUCOSE BLD-MCNC: 137 MG/DL (ref 70–99)
HCT VFR BLD CALC: 41.2 % (ref 41–53)
HEMOGLOBIN: 14 G/DL (ref 13.5–17.5)
IMMATURE GRANULOCYTES: ABNORMAL %
LYMPHOCYTES # BLD: 7 %
MCH RBC QN AUTO: 30 PG (ref 26–34)
MCHC RBC AUTO-ENTMCNC: 33.9 G/DL (ref 31–37)
MCV RBC AUTO: 88.6 FL (ref 80–100)
MONOCYTES # BLD: 4 %
MORPHOLOGY: NORMAL
PDW BLD-RTO: 14.3 % (ref 11.5–14.9)
PLATELET # BLD: 278 K/UL (ref 150–450)
PLATELET ESTIMATE: ABNORMAL
PMV BLD AUTO: 6.9 FL (ref 6–12)
POTASSIUM SERPL-SCNC: 4.6 MMOL/L (ref 3.7–5.3)
RBC # BLD: 4.65 M/UL (ref 4.5–5.9)
RBC # BLD: ABNORMAL 10*6/UL
SEG NEUTROPHILS: 89 %
SEGMENTED NEUTROPHILS ABSOLUTE COUNT: 13.79 K/UL (ref 1.3–9.1)
SODIUM BLD-SCNC: 141 MMOL/L (ref 135–144)
WBC # BLD: 15.5 K/UL (ref 3.5–11)
WBC # BLD: ABNORMAL 10*3/UL

## 2017-11-10 PROCEDURE — 80048 BASIC METABOLIC PNL TOTAL CA: CPT

## 2017-11-10 PROCEDURE — 85025 COMPLETE CBC W/AUTO DIFF WBC: CPT

## 2017-11-10 PROCEDURE — 71010 XR CHEST PORTABLE: CPT

## 2017-11-10 PROCEDURE — 6370000000 HC RX 637 (ALT 250 FOR IP): Performed by: INTERNAL MEDICINE

## 2017-11-10 PROCEDURE — 6360000002 HC RX W HCPCS: Performed by: INTERNAL MEDICINE

## 2017-11-10 PROCEDURE — 99232 SBSQ HOSP IP/OBS MODERATE 35: CPT | Performed by: FAMILY MEDICINE

## 2017-11-10 PROCEDURE — 36415 COLL VENOUS BLD VENIPUNCTURE: CPT

## 2017-11-10 RX ORDER — NICOTINE 21 MG/24HR
1 PATCH, TRANSDERMAL 24 HOURS TRANSDERMAL DAILY
Qty: 30 PATCH | Refills: 3 | Status: SHIPPED | OUTPATIENT
Start: 2017-11-10 | End: 2017-11-13

## 2017-11-10 RX ORDER — PREDNISONE 20 MG/1
20 TABLET ORAL DAILY
Status: DISCONTINUED | OUTPATIENT
Start: 2017-11-10 | End: 2017-11-10 | Stop reason: HOSPADM

## 2017-11-10 RX ORDER — PREDNISONE 20 MG/1
20 TABLET ORAL DAILY
Qty: 10 TABLET | Refills: 0 | Status: SHIPPED | OUTPATIENT
Start: 2017-11-10 | End: 2017-11-20

## 2017-11-10 RX ADMIN — AMLODIPINE BESYLATE 10 MG: 10 TABLET ORAL at 10:19

## 2017-11-10 RX ADMIN — METHYLPREDNISOLONE SODIUM SUCCINATE 40 MG: 40 INJECTION, POWDER, FOR SOLUTION INTRAMUSCULAR; INTRAVENOUS at 00:46

## 2017-11-10 RX ADMIN — TIOTROPIUM BROMIDE 18 MCG: 18 CAPSULE ORAL; RESPIRATORY (INHALATION) at 10:14

## 2017-11-10 RX ADMIN — METOPROLOL SUCCINATE 100 MG: 100 TABLET, EXTENDED RELEASE ORAL at 10:14

## 2017-11-10 RX ADMIN — HYDROCORTISONE: 1 CREAM TOPICAL at 09:30

## 2017-11-10 ASSESSMENT — PAIN SCALES - GENERAL
PAINLEVEL_OUTOF10: 0
PAINLEVEL_OUTOF10: 0

## 2017-11-10 NOTE — DISCHARGE INSTR - DIET

## 2017-11-10 NOTE — PROGRESS NOTES
Cheryl Garcia is a 58 y.o. male patient.     Current Facility-Administered Medications   Medication Dose Route Frequency Provider Last Rate Last Dose    sodium chloride flush 0.9 % injection 10 mL  10 mL Intravenous 2 times per day Geoff Haley MD        sodium chloride flush 0.9 % injection 10 mL  10 mL Intravenous PRN Geoff Haley MD        acetaminophen (TYLENOL) tablet 650 mg  650 mg Oral Q4H PRN Geoff Haley MD        magnesium hydroxide (MILK OF MAGNESIA) 400 MG/5ML suspension 30 mL  30 mL Oral Daily PRN Geoff Haley MD        ondansetron [facility]) injection 4 mg  4 mg Intravenous Q6H PRN Geoff Haley MD        hydrocortisone 1 % cream   Topical BID Rico LEONID Garcia DO        levalbuterol (XOPENEX) nebulizer solution 1.25 mg  1.25 mg Nebulization Q4H Geoff Haley MD   1.25 mg at 11/09/17 2056    0.9 % sodium chloride infusion   Intravenous Continuous Geoff Haley MD 70 mL/hr at 11/09/17 1846      amLODIPine (NORVASC) tablet 10 mg  10 mg Oral Daily Geoff Haley MD   10 mg at 11/09/17 0754    metoprolol succinate (TOPROL XL) extended release tablet 100 mg  100 mg Oral Daily Geoff Haley MD   100 mg at 11/09/17 0754    tiotropium (SPIRIVA) inhalation capsule 18 mcg  1 capsule Inhalation Daily Geoff Haley MD   18 mcg at 11/09/17 0754    methylPREDNISolone sodium (SOLU-MEDROL) injection 40 mg  40 mg Intravenous Q8H Geoff Haley MD   40 mg at 11/10/17 0046    sodium chloride flush 0.9 % injection 10 mL  10 mL Intravenous PRN Leelee Garner MD   10 mL at 11/09/17 1049    nicotine (NICODERM CQ) 14 MG/24HR 1 patch  1 patch Transdermal Daily Mona Salazar NP   1 patch at 11/09/17 1338     Facility-Administered Medications Ordered in Other Encounters   Medication Dose Route Frequency Provider Last Rate Last Dose    0.9 % sodium chloride infusion   Intravenous Continuous Berta Tobias MD   Stopped at 11/08/17 1015    acetaminophen (TYLENOL) tablet 650 mg  650 mg Oral Q4H PRN Berta Tobias MD Allergies   Allergen Reactions    Imdur [Isosorbide Dinitrate]     Lisinopril Swelling    Statins      Myalgia       Active Problems:    Lung cancer (HCC)    Acute exacerbation of chronic obstructive pulmonary disease (COPD) (HCC)    CAD (coronary artery disease)    Other pneumothorax    Shortness of breath    Blood pressure 128/86, pulse 85, temperature 98.1 °F (36.7 °C), temperature source Oral, resp. rate 20, height 5' 8\" (1.727 m), weight 135 lb 9.3 oz (61.5 kg), SpO2 99 %. Subjective:  Symptoms:  Stable. (Per wife, SOB with even minimal ambulation). Activity level: Returning to normal.      Objective:  General Appearance:  Comfortable. Vital signs: (most recent): Blood pressure 128/86, pulse 85, temperature 98.1 °F (36.7 °C), temperature source Oral, resp. rate 20, height 5' 8\" (1.727 m), weight 135 lb 9.3 oz (61.5 kg), SpO2 99 %. Vital signs are normal.    Lungs:  Normal effort and normal respiratory rate. There are decreased breath sounds and wheezes. Assessment & Plan   S/p lung biopsy with minimal pneumonthorax and exacerbation COPD - per pulmonary.      Ignacio Roberts MD  11/10/2017

## 2017-11-10 NOTE — DISCHARGE SUMMARY
Date of Admission: 11/9/2017  Date of Discharge: 11/10/2017  Diagnosis:  Pneumothorax  COPD  Lung mass      Shortness of Breath: at baseline  Cough:  +  Sputum: no  Hemoptysis: no  Chest Pain: minimal  Fever: no  Swelling Feet: no  Headache: no  Nausea, Emesis, Abdominal Pain: no  Diarrhea: no     Constipation: no    Events since last visit:     PAST MEDICAL HISTORY:    Smoking:   Scheduled Meds:  Continuous Infusions:  PRN Meds:        PHYSICAL EXAMINATION:  BP (!) 144/88   Pulse 96   Temp 97.3 °F (36.3 °C) (Oral)   Resp 16   Ht 5' 8\" (1.727 m)   Wt 135 lb 9.3 oz (61.5 kg)   SpO2 99%   BMI 20.62 kg/m²     General : Awake, alert, oriented to time, place, and person  Neck  supple, no lymphadenopathy, JVD not raised  Heart  regular rhythm, S1 and S2 normal; no additional sounds heard  Lungs  Air Entry- fair bilaterally; breath sounds : vesicular/ rhonchi/ coarse;   rales/crackles   Abdomen  soft, no tenderness  Upper Extremities  - no cyanosis, mottling; edema : absent  Lower Extremities: no cyanosis, mottling; edema : absent    Current Laboratory, Radiologic, Microbiologic, and Diagnostic studies reviewed  Data ReviewCBC:   Recent Labs      11/08/17   2315  11/09/17   0543  11/10/17   0437   WBC  9.1  11.3*  15.5*   RBC  5.31  4.96  4.65   HGB  15.9  14.8  14.0   HCT  47.0  44.0  41.2   PLT  330  283  278     BMP:   Recent Labs      11/08/17 2315 11/09/17 0543  11/10/17   0437   GLUCOSE  115*  154*  137*   NA  143  141  141   K  4.4  4.7  4.6   BUN  18  17  14   CREATININE  0.70  0.53*  0.54*   CALCIUM  9.3  8.9  9.1     ABGs: No results for input(s): PHART, PO2ART, EMP8JRO, BLS1JBY, BEART, V9YJXAFN, NWX5BIX in the last 72 hours.    PT/INR:  No results found for: PTINR  Path report, MRI head d/w patient  ASSESSMENT / PLAN:  Lung mass- Biopsy non diagnostic; granulomas seen; AFB, fungus stains pending  COPD - BD, O2  PT after needle Bx - stable  OK for DC f/ u in office  meds per med reonciliation  This is late note on patient seen earlier today.     Electronically signed by Dalila Rizvi on 11/10/17

## 2017-11-10 NOTE — FLOWSHEET NOTE
11/10/17 1030   Encounter Summary   Services provided to: Patient   Referral/Consult From: [facility]   Continue Visiting (11/10/17)   Volunteer Visit Yes   Complexity of Encounter Low   Length of Encounter 15 minutes   Routine   Type Follow up   Spiritual/Yazdanism   Type Spiritual support   Intervention Communion   Sacraments   Communion Patient received communion

## 2017-11-11 ENCOUNTER — CARE COORDINATION (OUTPATIENT)
Dept: CASE MANAGEMENT | Age: 62
End: 2017-11-11

## 2017-11-11 NOTE — CARE COORDINATION
[facility] Transitions Initial Follow Up Call    Call within 2 business days of discharge: Yes    Patient: Idris Rubin Patient : 1955   MRN: <[id number]>   There are no discharge diagnoses documented for the most recent discharge. Discharge Date: 11/10/17 RARS: [facility] Risk Score: 14.75     Spoke with: [facility]: [facility]    Non-face-to-face services provided:  Obtained and reviewed discharge summary and/or continuity of care documents      Care Transitions 24 Hour Call    Do you have any ongoing symptoms?:  Yes  Patient-reported symptoms:  Shortness of Breath (Comment: Patient has a chonic condition, but says the SOB is better.)  Do you have a copy of your discharge instructions?:  Yes  Do you have all of your prescriptions and are they filled?:  Yes  Have you scheduled your follow up appointment?:  No (Comment: Patient said he will call on Monday to schedule follow up. Wife will transport.)  Were you discharged with any Home Care or Post Acute Services:  No  Care Transitions Interventions         Follow Up  No future appointments.     Rudi Hilliard RN

## 2017-11-13 ENCOUNTER — TELEPHONE (OUTPATIENT)
Dept: PHARMACY | Facility: CLINIC | Age: 62
End: 2017-11-13

## 2017-11-13 LAB
CULTURE: ABNORMAL
CULTURE: ABNORMAL
DIRECT EXAM: ABNORMAL
DIRECT EXAM: ABNORMAL
Lab: ABNORMAL
SPECIMEN DESCRIPTION: ABNORMAL
STATUS: ABNORMAL
SURGICAL PATHOLOGY REPORT: NORMAL

## 2017-11-13 NOTE — TELEPHONE ENCOUNTER
CLINICAL PHARMACY NOTE - Post-Discharge Transitions of Care (AURY)    Second attempt made to reach patient by telephone for medication review. Spoke with patient - refused complete medication review, states no questions or concerns at this time. Did inquire whether patient filled new prescriptions - patient states filled prednisone but not the nicotine patches, does not plan to use patches.  Will updated med list.     Gudelia Heart, PharmD  Clinical Pharmacy Specialist  O: [phone]  C: [address] [place] [address]  [phone], [address]    =======================================================    For Pharmacy Admin Tracking Only  TCM Call Made?: No  [facility] ([facility] Select Patient?: Yes  Total # of Interventions Recommended: 1 - Updated Order #: 1  Total # Interventions Accepted: 1  Intervention Severity:   - Level 1 Intervention Present?: No   - Level 2 #: 0   - Level 3 #: 0  Outreach Status: Patient Refused  Care Coordinator Outreach to Patient?: Yes  Provider Contacted?: No  Time Spent (min): 5

## 2017-11-15 ENCOUNTER — CARE COORDINATION (OUTPATIENT)
Dept: CASE MANAGEMENT | Age: 62
End: 2017-11-15

## 2017-11-20 ENCOUNTER — CARE COORDINATION (OUTPATIENT)
Dept: CASE MANAGEMENT | Age: 62
End: 2017-11-20

## 2017-11-22 NOTE — CARE COORDINATION
2nd attempt to reach patient for final care transitions call. Unable to LM as VM is not set up. Will try again at later time/date.  -NR

## 2017-11-30 ENCOUNTER — HOSPITAL ENCOUNTER (OUTPATIENT)
Dept: PULMONOLOGY | Age: 62
Setting detail: THERAPIES SERIES
Discharge: HOME | End: 2017-11-30
Payer: MEDICARE

## 2017-11-30 VITALS — WEIGHT: 129.8 LBS | BODY MASS INDEX: 19.74 KG/M2

## 2017-11-30 PROCEDURE — G0424 PULMONARY REHAB W EXER: HCPCS

## 2017-12-11 LAB
CULTURE: NORMAL
CULTURE: NORMAL
Lab: NORMAL
SPECIMEN DESCRIPTION: NORMAL
SPECIMEN DESCRIPTION: NORMAL
STATUS: NORMAL

## 2017-12-25 LAB
CULTURE: NORMAL
CULTURE: NORMAL
DIRECT EXAM: NORMAL
Lab: NORMAL
SPECIMEN DESCRIPTION: NORMAL
SPECIMEN DESCRIPTION: NORMAL
STATUS: NORMAL

## 2018-01-08 ENCOUNTER — HOSPITAL ENCOUNTER (OUTPATIENT)
Dept: PULMONOLOGY | Age: 63
Setting detail: THERAPIES SERIES
Discharge: HOME | End: 2018-01-08
Payer: MEDICARE

## 2018-01-08 VITALS — WEIGHT: 129 LBS | BODY MASS INDEX: 19.61 KG/M2

## 2018-01-08 PROCEDURE — G0424 PULMONARY REHAB W EXER: HCPCS

## 2018-01-15 ENCOUNTER — HOSPITAL ENCOUNTER (OUTPATIENT)
Dept: PULMONOLOGY | Age: 63
Setting detail: THERAPIES SERIES
Discharge: HOME | End: 2018-01-15
Payer: MEDICARE

## 2018-01-15 VITALS — WEIGHT: 130 LBS | BODY MASS INDEX: 19.77 KG/M2

## 2018-01-15 PROCEDURE — G0424 PULMONARY REHAB W EXER: HCPCS

## 2018-01-16 NOTE — PROGRESS NOTES
Patient provided with written material about ***, a medication/s given while in the hospital.  Questions encouraged and answered. Will follow up as needed.

## 2018-01-17 ENCOUNTER — HOSPITAL ENCOUNTER (OUTPATIENT)
Dept: PULMONOLOGY | Age: 63
Setting detail: THERAPIES SERIES
Discharge: HOME | End: 2018-01-17
Payer: MEDICARE

## 2018-01-17 VITALS — BODY MASS INDEX: 19.77 KG/M2 | WEIGHT: 130 LBS

## 2018-01-17 PROCEDURE — G0424 PULMONARY REHAB W EXER: HCPCS

## 2018-01-22 ENCOUNTER — HOSPITAL ENCOUNTER (OUTPATIENT)
Dept: PULMONOLOGY | Age: 63
Setting detail: THERAPIES SERIES
Discharge: HOME | End: 2018-01-22
Payer: MEDICARE

## 2018-01-22 VITALS — WEIGHT: 132 LBS | BODY MASS INDEX: 20.07 KG/M2

## 2018-01-22 PROCEDURE — G0424 PULMONARY REHAB W EXER: HCPCS

## 2018-01-24 ENCOUNTER — HOSPITAL ENCOUNTER (OUTPATIENT)
Dept: PULMONOLOGY | Age: 63
Setting detail: THERAPIES SERIES
Discharge: HOME | End: 2018-01-24
Payer: MEDICARE

## 2018-01-24 VITALS — WEIGHT: 132.4 LBS | BODY MASS INDEX: 20.13 KG/M2

## 2018-01-24 PROCEDURE — G0424 PULMONARY REHAB W EXER: HCPCS

## 2018-02-07 ENCOUNTER — HOSPITAL ENCOUNTER (OUTPATIENT)
Dept: PULMONOLOGY | Age: 63
Setting detail: THERAPIES SERIES
Discharge: HOME | End: 2018-02-07
Payer: MEDICARE

## 2018-02-14 ENCOUNTER — HOSPITAL ENCOUNTER (OUTPATIENT)
Dept: PULMONOLOGY | Age: 63
Setting detail: THERAPIES SERIES
Discharge: HOME | End: 2018-02-14
Payer: MEDICARE

## 2018-02-14 VITALS — BODY MASS INDEX: 20.07 KG/M2 | WEIGHT: 132 LBS

## 2018-02-14 PROCEDURE — G0424 PULMONARY REHAB W EXER: HCPCS

## 2018-02-21 ENCOUNTER — HOSPITAL ENCOUNTER (OUTPATIENT)
Dept: PULMONOLOGY | Age: 63
Setting detail: THERAPIES SERIES
Discharge: HOME | End: 2018-02-21
Payer: MEDICARE

## 2018-02-21 VITALS — WEIGHT: 130.4 LBS | BODY MASS INDEX: 19.83 KG/M2

## 2018-02-21 PROCEDURE — G0424 PULMONARY REHAB W EXER: HCPCS

## 2018-02-22 NOTE — PROGRESS NOTES
Consistent with most recent physician signed TP- Pulmonary Rehabilitation Medical Review  w / Dr. Nany Saldivar was completed on 02/22/18.

## 2018-03-01 ENCOUNTER — HOSPITAL ENCOUNTER (OUTPATIENT)
Dept: CT IMAGING | Age: 63
Discharge: HOME | End: 2018-03-03
Payer: MEDICARE

## 2018-03-01 DIAGNOSIS — R91.8 PULMONARY NODULES: ICD-10-CM

## 2018-03-01 PROCEDURE — 71250 CT THORAX DX C-: CPT

## 2018-03-12 ENCOUNTER — HOSPITAL ENCOUNTER (OUTPATIENT)
Age: 63
Discharge: HOME | End: 2018-03-12
Payer: MEDICARE

## 2018-03-12 LAB
ALLEN TEST: ABNORMAL
CARBOXYHEMOGLOBIN: 3.3 % (ref 0–5)
FIO2: ABNORMAL
HCO3 ARTERIAL: 27.8 MMOL/L (ref 22–26)
METHEMOGLOBIN: 0.3 % (ref 0–1.9)
MODE: ABNORMAL
NEGATIVE BASE EXCESS, ART: ABNORMAL MMOL/L (ref 0–2)
NOTIFICATION TIME: ABNORMAL
NOTIFICATION: ABNORMAL
O2 DEVICE/FLOW/%: ABNORMAL
O2 SAT, ARTERIAL: 92.9 % (ref 95–98)
OXYHEMOGLOBIN: ABNORMAL % (ref 95–98)
PATIENT TEMP: 37
PCO2 ARTERIAL: 43.2 MMHG (ref 35–45)
PCO2, ART, TEMP ADJ: ABNORMAL (ref 35–45)
PEEP/CPAP: ABNORMAL
PH ARTERIAL: 7.42 (ref 7.35–7.45)
PH, ART, TEMP ADJ: ABNORMAL (ref 7.35–7.45)
PO2 ARTERIAL: 66.3 MMHG (ref 80–100)
PO2, ART, TEMP ADJ: ABNORMAL MMHG (ref 80–100)
POSITIVE BASE EXCESS, ART: 3.3 MMOL/L (ref 0–2)
PSV: ABNORMAL
PT. POSITION: ABNORMAL
RESPIRATORY RATE: 20
SAMPLE SITE: ABNORMAL
SET RATE: ABNORMAL
TEXT FOR RESPIRATORY: ABNORMAL
TOTAL HB: ABNORMAL G/DL (ref 12–16)
TOTAL RATE: ABNORMAL
VT: ABNORMAL

## 2018-03-12 PROCEDURE — 36600 WITHDRAWAL OF ARTERIAL BLOOD: CPT

## 2018-03-12 PROCEDURE — 82805 BLOOD GASES W/O2 SATURATION: CPT

## 2018-03-14 ENCOUNTER — HOSPITAL ENCOUNTER (OUTPATIENT)
Dept: PULMONOLOGY | Age: 63
Setting detail: THERAPIES SERIES
Discharge: HOME | End: 2018-03-14
Payer: MEDICARE

## 2018-11-15 PROBLEM — R35.0 BENIGN PROSTATIC HYPERPLASIA WITH URINARY FREQUENCY: Status: ACTIVE | Noted: 2018-11-15

## 2018-11-15 PROBLEM — N40.1 BENIGN PROSTATIC HYPERPLASIA WITH URINARY FREQUENCY: Status: ACTIVE | Noted: 2018-11-15

## 2018-12-03 ENCOUNTER — HOSPITAL ENCOUNTER (INPATIENT)
Age: 63
LOS: 4 days | Discharge: HOSPICE | DRG: 191 | End: 2018-12-07
Attending: EMERGENCY MEDICINE | Admitting: FAMILY MEDICINE
Payer: MEDICARE

## 2018-12-03 ENCOUNTER — APPOINTMENT (OUTPATIENT)
Dept: GENERAL RADIOLOGY | Age: 63
DRG: 191 | End: 2018-12-03
Payer: MEDICARE

## 2018-12-03 DIAGNOSIS — J44.1 COPD EXACERBATION (HCC): Primary | ICD-10-CM

## 2018-12-03 LAB
ABSOLUTE EOS #: 0 K/UL (ref 0–0.4)
ABSOLUTE IMMATURE GRANULOCYTE: ABNORMAL K/UL (ref 0–0.3)
ABSOLUTE LYMPH #: 1 K/UL (ref 1–4.8)
ABSOLUTE MONO #: 1.1 K/UL (ref 0.1–1.3)
ANION GAP SERPL CALCULATED.3IONS-SCNC: 13 MMOL/L (ref 9–17)
BASOPHILS # BLD: 0 % (ref 0–2)
BASOPHILS ABSOLUTE: 0.1 K/UL (ref 0–0.2)
BNP INTERPRETATION: NORMAL
BUN BLDV-MCNC: 32 MG/DL (ref 8–23)
BUN/CREAT BLD: ABNORMAL (ref 9–20)
CALCIUM SERPL-MCNC: 9.8 MG/DL (ref 8.6–10.4)
CHLORIDE BLD-SCNC: 89 MMOL/L (ref 98–107)
CO2: 34 MMOL/L (ref 20–31)
CREAT SERPL-MCNC: 0.73 MG/DL (ref 0.7–1.2)
DIFFERENTIAL TYPE: ABNORMAL
EKG ATRIAL RATE: 107 BPM
EKG P AXIS: 87 DEGREES
EKG P-R INTERVAL: 110 MS
EKG Q-T INTERVAL: 320 MS
EKG QRS DURATION: 84 MS
EKG QTC CALCULATION (BAZETT): 427 MS
EKG R AXIS: 48 DEGREES
EKG T AXIS: 84 DEGREES
EKG VENTRICULAR RATE: 107 BPM
EOSINOPHILS RELATIVE PERCENT: 0 % (ref 0–4)
GFR AFRICAN AMERICAN: >60 ML/MIN
GFR NON-AFRICAN AMERICAN: >60 ML/MIN
GFR SERPL CREATININE-BSD FRML MDRD: ABNORMAL ML/MIN/{1.73_M2}
GFR SERPL CREATININE-BSD FRML MDRD: ABNORMAL ML/MIN/{1.73_M2}
GLUCOSE BLD-MCNC: 94 MG/DL (ref 70–99)
HCT VFR BLD CALC: 38.4 % (ref 41–53)
HEMOGLOBIN: 12.8 G/DL (ref 13.5–17.5)
IMMATURE GRANULOCYTES: ABNORMAL %
INR BLD: 1
LV EF: 65 %
LVEF MODALITY: NORMAL
LYMPHOCYTES # BLD: 8 % (ref 24–44)
MCH RBC QN AUTO: 29.3 PG (ref 26–34)
MCHC RBC AUTO-ENTMCNC: 33.3 G/DL (ref 31–37)
MCV RBC AUTO: 88.1 FL (ref 80–100)
MONOCYTES # BLD: 9 % (ref 1–7)
MYOGLOBIN: 207 NG/ML (ref 28–72)
MYOGLOBIN: 237 NG/ML (ref 28–72)
NRBC AUTOMATED: ABNORMAL PER 100 WBC
PARTIAL THROMBOPLASTIN TIME: 26.4 SEC (ref 23–31)
PDW BLD-RTO: 13.1 % (ref 11.5–14.9)
PLATELET # BLD: 248 K/UL (ref 150–450)
PLATELET ESTIMATE: ABNORMAL
PMV BLD AUTO: 6.7 FL (ref 6–12)
POTASSIUM SERPL-SCNC: 4.6 MMOL/L (ref 3.7–5.3)
PRO-BNP: 264 PG/ML
PROTHROMBIN TIME: 10.9 SEC (ref 9.7–12)
RBC # BLD: 4.36 M/UL (ref 4.5–5.9)
RBC # BLD: ABNORMAL 10*6/UL
SEG NEUTROPHILS: 83 % (ref 36–66)
SEGMENTED NEUTROPHILS ABSOLUTE COUNT: 10.4 K/UL (ref 1.3–9.1)
SODIUM BLD-SCNC: 136 MMOL/L (ref 135–144)
TROPONIN INTERP: ABNORMAL
TROPONIN INTERP: ABNORMAL
TROPONIN T: <0.03 NG/ML
TROPONIN T: <0.03 NG/ML
WBC # BLD: 12.6 K/UL (ref 3.5–11)
WBC # BLD: ABNORMAL 10*3/UL

## 2018-12-03 PROCEDURE — 2700000000 HC OXYGEN THERAPY PER DAY

## 2018-12-03 PROCEDURE — 94761 N-INVAS EAR/PLS OXIMETRY MLT: CPT

## 2018-12-03 PROCEDURE — 6370000000 HC RX 637 (ALT 250 FOR IP): Performed by: INTERNAL MEDICINE

## 2018-12-03 PROCEDURE — 6360000002 HC RX W HCPCS: Performed by: FAMILY MEDICINE

## 2018-12-03 PROCEDURE — 93005 ELECTROCARDIOGRAM TRACING: CPT

## 2018-12-03 PROCEDURE — 2580000003 HC RX 258: Performed by: FAMILY MEDICINE

## 2018-12-03 PROCEDURE — 6360000002 HC RX W HCPCS: Performed by: INTERNAL MEDICINE

## 2018-12-03 PROCEDURE — 84484 ASSAY OF TROPONIN QUANT: CPT

## 2018-12-03 PROCEDURE — 93306 TTE W/DOPPLER COMPLETE: CPT

## 2018-12-03 PROCEDURE — 6360000002 HC RX W HCPCS: Performed by: EMERGENCY MEDICINE

## 2018-12-03 PROCEDURE — 6370000000 HC RX 637 (ALT 250 FOR IP): Performed by: EMERGENCY MEDICINE

## 2018-12-03 PROCEDURE — 6370000000 HC RX 637 (ALT 250 FOR IP): Performed by: FAMILY MEDICINE

## 2018-12-03 PROCEDURE — 85610 PROTHROMBIN TIME: CPT

## 2018-12-03 PROCEDURE — 71045 X-RAY EXAM CHEST 1 VIEW: CPT

## 2018-12-03 PROCEDURE — 85025 COMPLETE CBC W/AUTO DIFF WBC: CPT

## 2018-12-03 PROCEDURE — 1200000000 HC SEMI PRIVATE

## 2018-12-03 PROCEDURE — 94664 DEMO&/EVAL PT USE INHALER: CPT

## 2018-12-03 PROCEDURE — 99285 EMERGENCY DEPT VISIT HI MDM: CPT

## 2018-12-03 PROCEDURE — 36415 COLL VENOUS BLD VENIPUNCTURE: CPT

## 2018-12-03 PROCEDURE — 83880 ASSAY OF NATRIURETIC PEPTIDE: CPT

## 2018-12-03 PROCEDURE — 80048 BASIC METABOLIC PNL TOTAL CA: CPT

## 2018-12-03 PROCEDURE — 94640 AIRWAY INHALATION TREATMENT: CPT

## 2018-12-03 PROCEDURE — 83874 ASSAY OF MYOGLOBIN: CPT

## 2018-12-03 PROCEDURE — 85730 THROMBOPLASTIN TIME PARTIAL: CPT

## 2018-12-03 PROCEDURE — 2500000003 HC RX 250 WO HCPCS: Performed by: FAMILY MEDICINE

## 2018-12-03 RX ORDER — ASPIRIN 81 MG/1
324 TABLET, CHEWABLE ORAL ONCE
Status: COMPLETED | OUTPATIENT
Start: 2018-12-03 | End: 2018-12-03

## 2018-12-03 RX ORDER — ASPIRIN 81 MG/1
81 TABLET ORAL DAILY
Status: DISCONTINUED | OUTPATIENT
Start: 2018-12-03 | End: 2018-12-07 | Stop reason: HOSPADM

## 2018-12-03 RX ORDER — SENNA AND DOCUSATE SODIUM 50; 8.6 MG/1; MG/1
1 TABLET, FILM COATED ORAL NIGHTLY
Status: DISCONTINUED | OUTPATIENT
Start: 2018-12-03 | End: 2018-12-07 | Stop reason: HOSPADM

## 2018-12-03 RX ORDER — TAMSULOSIN HYDROCHLORIDE 0.4 MG/1
0.4 CAPSULE ORAL DAILY
Status: DISCONTINUED | OUTPATIENT
Start: 2018-12-03 | End: 2018-12-07 | Stop reason: HOSPADM

## 2018-12-03 RX ORDER — SODIUM CHLORIDE 0.9 % (FLUSH) 0.9 %
10 SYRINGE (ML) INJECTION EVERY 12 HOURS SCHEDULED
Status: DISCONTINUED | OUTPATIENT
Start: 2018-12-03 | End: 2018-12-07 | Stop reason: HOSPADM

## 2018-12-03 RX ORDER — ALBUTEROL SULFATE 2.5 MG/3ML
2.5 SOLUTION RESPIRATORY (INHALATION)
Status: DISCONTINUED | OUTPATIENT
Start: 2018-12-03 | End: 2018-12-03

## 2018-12-03 RX ORDER — BUSPIRONE HYDROCHLORIDE 10 MG/1
10 TABLET ORAL DAILY
COMMUNITY

## 2018-12-03 RX ORDER — MORPHINE SULFATE 10 MG/5ML
6 SOLUTION ORAL
Status: DISCONTINUED | OUTPATIENT
Start: 2018-12-03 | End: 2018-12-07 | Stop reason: HOSPADM

## 2018-12-03 RX ORDER — IPRATROPIUM BROMIDE AND ALBUTEROL SULFATE 2.5; .5 MG/3ML; MG/3ML
1 SOLUTION RESPIRATORY (INHALATION)
Status: DISCONTINUED | OUTPATIENT
Start: 2018-12-03 | End: 2018-12-03

## 2018-12-03 RX ORDER — METOPROLOL TARTRATE 5 MG/5ML
5 INJECTION INTRAVENOUS ONCE
Status: COMPLETED | OUTPATIENT
Start: 2018-12-03 | End: 2018-12-03

## 2018-12-03 RX ORDER — METHYLPREDNISOLONE SODIUM SUCCINATE 125 MG/2ML
125 INJECTION, POWDER, LYOPHILIZED, FOR SOLUTION INTRAMUSCULAR; INTRAVENOUS ONCE
Status: COMPLETED | OUTPATIENT
Start: 2018-12-03 | End: 2018-12-03

## 2018-12-03 RX ORDER — FUROSEMIDE 10 MG/ML
20 INJECTION INTRAMUSCULAR; INTRAVENOUS ONCE
Status: COMPLETED | OUTPATIENT
Start: 2018-12-03 | End: 2018-12-03

## 2018-12-03 RX ORDER — MORPHINE SULFATE 10 MG/5ML
SOLUTION ORAL
COMMUNITY

## 2018-12-03 RX ORDER — SODIUM CHLORIDE 0.9 % (FLUSH) 0.9 %
10 SYRINGE (ML) INJECTION PRN
Status: DISCONTINUED | OUTPATIENT
Start: 2018-12-03 | End: 2018-12-07 | Stop reason: HOSPADM

## 2018-12-03 RX ORDER — BUSPIRONE HYDROCHLORIDE 5 MG/1
10 TABLET ORAL DAILY
Status: DISCONTINUED | OUTPATIENT
Start: 2018-12-03 | End: 2018-12-07 | Stop reason: HOSPADM

## 2018-12-03 RX ORDER — NITROGLYCERIN 0.4 MG/1
0.4 TABLET SUBLINGUAL EVERY 5 MIN PRN
Status: DISCONTINUED | OUTPATIENT
Start: 2018-12-03 | End: 2018-12-07 | Stop reason: HOSPADM

## 2018-12-03 RX ORDER — METOPROLOL TARTRATE 5 MG/5ML
5 INJECTION INTRAVENOUS EVERY 4 HOURS PRN
Status: DISCONTINUED | OUTPATIENT
Start: 2018-12-03 | End: 2018-12-07 | Stop reason: HOSPADM

## 2018-12-03 RX ORDER — GUAIFENESIN 600 MG/1
600 TABLET, EXTENDED RELEASE ORAL 2 TIMES DAILY
Status: DISCONTINUED | OUTPATIENT
Start: 2018-12-03 | End: 2018-12-07 | Stop reason: HOSPADM

## 2018-12-03 RX ORDER — ALBUTEROL SULFATE 2.5 MG/3ML
2.5 SOLUTION RESPIRATORY (INHALATION) 4 TIMES DAILY
Status: DISCONTINUED | OUTPATIENT
Start: 2018-12-03 | End: 2018-12-07 | Stop reason: HOSPADM

## 2018-12-03 RX ORDER — METOPROLOL SUCCINATE 100 MG/1
100 TABLET, EXTENDED RELEASE ORAL DAILY
Status: DISCONTINUED | OUTPATIENT
Start: 2018-12-03 | End: 2018-12-07 | Stop reason: HOSPADM

## 2018-12-03 RX ORDER — ONDANSETRON 2 MG/ML
4 INJECTION INTRAMUSCULAR; INTRAVENOUS EVERY 6 HOURS PRN
Status: DISCONTINUED | OUTPATIENT
Start: 2018-12-03 | End: 2018-12-07 | Stop reason: HOSPADM

## 2018-12-03 RX ORDER — ALBUTEROL SULFATE 2.5 MG/3ML
2.5 SOLUTION RESPIRATORY (INHALATION)
Status: DISCONTINUED | OUTPATIENT
Start: 2018-12-03 | End: 2018-12-07 | Stop reason: HOSPADM

## 2018-12-03 RX ORDER — METHYLPREDNISOLONE SODIUM SUCCINATE 125 MG/2ML
60 INJECTION, POWDER, LYOPHILIZED, FOR SOLUTION INTRAMUSCULAR; INTRAVENOUS DAILY
Status: DISCONTINUED | OUTPATIENT
Start: 2018-12-03 | End: 2018-12-03

## 2018-12-03 RX ORDER — AMLODIPINE BESYLATE 10 MG/1
10 TABLET ORAL DAILY
Status: DISCONTINUED | OUTPATIENT
Start: 2018-12-03 | End: 2018-12-04

## 2018-12-03 RX ORDER — METHYLPREDNISOLONE SODIUM SUCCINATE 125 MG/2ML
60 INJECTION, POWDER, LYOPHILIZED, FOR SOLUTION INTRAMUSCULAR; INTRAVENOUS EVERY 8 HOURS
Status: DISCONTINUED | OUTPATIENT
Start: 2018-12-03 | End: 2018-12-07 | Stop reason: HOSPADM

## 2018-12-03 RX ORDER — ESCITALOPRAM OXALATE 20 MG/1
20 TABLET ORAL DAILY
Status: DISCONTINUED | OUTPATIENT
Start: 2018-12-03 | End: 2018-12-07 | Stop reason: HOSPADM

## 2018-12-03 RX ADMIN — METHYLPREDNISOLONE SODIUM SUCCINATE 125 MG: 125 INJECTION, POWDER, FOR SOLUTION INTRAMUSCULAR; INTRAVENOUS at 10:42

## 2018-12-03 RX ADMIN — SENNOSIDES AND DOCUSATE SODIUM 1 TABLET: 8.6; 5 TABLET ORAL at 20:20

## 2018-12-03 RX ADMIN — AMLODIPINE BESYLATE 10 MG: 10 TABLET ORAL at 12:54

## 2018-12-03 RX ADMIN — ENOXAPARIN SODIUM 30 MG: 30 INJECTION SUBCUTANEOUS at 12:53

## 2018-12-03 RX ADMIN — MORPHINE SULFATE 6 MG: 10 SOLUTION ORAL at 15:34

## 2018-12-03 RX ADMIN — Medication 10 ML: at 13:08

## 2018-12-03 RX ADMIN — ESCITALOPRAM OXALATE 20 MG: 20 TABLET ORAL at 12:54

## 2018-12-03 RX ADMIN — METOPROLOL SUCCINATE 100 MG: 100 TABLET, EXTENDED RELEASE ORAL at 12:55

## 2018-12-03 RX ADMIN — MORPHINE SULFATE 6 MG: 10 SOLUTION ORAL at 20:51

## 2018-12-03 RX ADMIN — BUSPIRONE HYDROCHLORIDE 10 MG: 5 TABLET ORAL at 12:54

## 2018-12-03 RX ADMIN — METHYLPREDNISOLONE SODIUM SUCCINATE 60 MG: 125 INJECTION, POWDER, FOR SOLUTION INTRAMUSCULAR; INTRAVENOUS at 17:35

## 2018-12-03 RX ADMIN — IPRATROPIUM BROMIDE AND ALBUTEROL SULFATE 1 AMPULE: .5; 3 SOLUTION RESPIRATORY (INHALATION) at 08:59

## 2018-12-03 RX ADMIN — ASPIRIN 81 MG 324 MG: 81 TABLET ORAL at 09:10

## 2018-12-03 RX ADMIN — ALBUTEROL SULFATE 2.5 MG: 2.5 SOLUTION RESPIRATORY (INHALATION) at 20:26

## 2018-12-03 RX ADMIN — Medication 10 ML: at 20:20

## 2018-12-03 RX ADMIN — TIOTROPIUM BROMIDE 18 MCG: 18 CAPSULE ORAL; RESPIRATORY (INHALATION) at 12:54

## 2018-12-03 RX ADMIN — TAMSULOSIN HYDROCHLORIDE 0.4 MG: 0.4 CAPSULE ORAL at 12:54

## 2018-12-03 RX ADMIN — ALBUTEROL SULFATE 2.5 MG: 2.5 SOLUTION RESPIRATORY (INHALATION) at 15:34

## 2018-12-03 RX ADMIN — FUROSEMIDE 20 MG: 10 INJECTION, SOLUTION INTRAMUSCULAR; INTRAVENOUS at 12:54

## 2018-12-03 RX ADMIN — MORPHINE SULFATE 6 MG: 10 SOLUTION ORAL at 12:49

## 2018-12-03 RX ADMIN — GUAIFENESIN 600 MG: 600 TABLET, EXTENDED RELEASE ORAL at 12:54

## 2018-12-03 RX ADMIN — GUAIFENESIN 600 MG: 600 TABLET, EXTENDED RELEASE ORAL at 20:20

## 2018-12-03 RX ADMIN — ALBUTEROL SULFATE 2.5 MG: 2.5 SOLUTION RESPIRATORY (INHALATION) at 11:40

## 2018-12-03 RX ADMIN — MOMETASONE FUROATE AND FORMOTEROL FUMARATE DIHYDRATE 2 PUFF: 200; 5 AEROSOL RESPIRATORY (INHALATION) at 20:20

## 2018-12-03 RX ADMIN — MOMETASONE FUROATE AND FORMOTEROL FUMARATE DIHYDRATE 2 PUFF: 200; 5 AEROSOL RESPIRATORY (INHALATION) at 12:54

## 2018-12-03 RX ADMIN — METOPROLOL TARTRATE 5 MG: 1 INJECTION, SOLUTION INTRAVENOUS at 15:34

## 2018-12-03 RX ADMIN — IPRATROPIUM BROMIDE AND ALBUTEROL SULFATE 1 AMPULE: .5; 3 SOLUTION RESPIRATORY (INHALATION) at 08:50

## 2018-12-03 ASSESSMENT — PAIN DESCRIPTION - LOCATION: LOCATION: FOOT

## 2018-12-03 ASSESSMENT — ENCOUNTER SYMPTOMS
WHEEZING: 0
CHEST TIGHTNESS: 0
EYE PAIN: 0
DIARRHEA: 0
EYE DISCHARGE: 0
VOMITING: 0
SINUS PRESSURE: 0
ABDOMINAL PAIN: 0
COUGH: 0
BLOOD IN STOOL: 0
FACIAL SWELLING: 0
BACK PAIN: 0
CONSTIPATION: 0
SORE THROAT: 0
TROUBLE SWALLOWING: 0
COLOR CHANGE: 0
SHORTNESS OF BREATH: 1
RHINORRHEA: 0
EYE REDNESS: 0
NAUSEA: 0

## 2018-12-03 ASSESSMENT — PAIN SCALES - GENERAL
PAINLEVEL_OUTOF10: 8
PAINLEVEL_OUTOF10: 2
PAINLEVEL_OUTOF10: 2
PAINLEVEL_OUTOF10: 4
PAINLEVEL_OUTOF10: 9
PAINLEVEL_OUTOF10: 0
PAINLEVEL_OUTOF10: 1

## 2018-12-03 ASSESSMENT — PAIN DESCRIPTION - DESCRIPTORS: DESCRIPTORS: ACHING

## 2018-12-03 ASSESSMENT — PAIN DESCRIPTION - PAIN TYPE: TYPE: ACUTE PAIN

## 2018-12-03 ASSESSMENT — PAIN DESCRIPTION - ORIENTATION: ORIENTATION: RIGHT;LEFT

## 2018-12-03 NOTE — ED NOTES
Pt arrived in ED with c/o SOB. Pt states he has COPD and is on oxygen 2L at home continuously. Pt states his SOB has worsened. Pt states he is currently receiving palliative care for his COPD and receives a dose of morphine every 3 hours. Pt is alert and oriented x4. Pt is tachypneic. Pt pausing in between sentences. Pt's oxygen saturation at 95% on 2L.       Shelly Wen RN  12/03/18 3416

## 2018-12-03 NOTE — ED PROVIDER NOTES
seizures, syncope, speech difficulty, weakness, numbness and headaches. Psychiatric/Behavioral: Negative for confusion, decreased concentration, hallucinations, self-injury, sleep disturbance and suicidal ideas. PAST MEDICAL HISTORY     Past Medical History:   Diagnosis Date    Arthritis     CAD (coronary artery disease)     Cataract     [facility]ronic [facility] pulmonary [facility]) ([facility] [address]     Emphysema    Hypertension     Inguinal hernia     right    Lung cancer ([facility] [address] 12/17/2012    Stage IA [place]: robotic RUL    MI (myocardial infarction) ([facility] [address]     2009    Oxygen dependent     O2 AT HS @2 L PER CANNULA.    SOB (shortness of breath) on exertion     Vascular disease     Wears glasses        SURGICAL HISTORY       Past Surgical History:   Procedure Laterality Date    BACK SURGERY  cervical fusion    CATARACT REMOVAL      Rt. eye    COLONOSCOPY      CORONARY ANGIOPLASTY WITH STENT PLACEMENT  2009    had stent x1 before 2009 as well. ( total of 2 stents)    EYE SURGERY      rt. cataract extracted with IOL    INGUINAL HERNIA REPAIR  2012    INGUINAL HERNIA REPAIR Right 8/28/14    Dr Mary Aviles Right 8/28/14    excision of anal lesion    LOBECTOMY  12/17/12    RUL robotic   [address] OTHER SURGICAL HISTORY  2002    neck fusion    UPPER GASTROINTESTINAL ENDOSCOPY  6/2/14    polyp biopsy, Dr Giselle Mckenzie       Previous Medications    ALBUTEROL (PROVENTIL) (2.5 MG/3ML) 0.083% NEBULIZER SOLUTION    Take by nebulization daily    AMLODIPINE (NORVASC) 10 MG TABLET    Take 1 tablet by mouth daily    ASPIR-LOW 81 MG EC TABLET    Take 1 tablet by mouth daily    BUSPIRONE (BUSPAR) 10 MG TABLET    Take 10 mg by mouth daily    ESCITALOPRAM (LEXAPRO) 10 MG TABLET    Take 1 tablet by mouth daily    FLUTICASONE FUROATE-VILANTEROL (BREO ELLIPTA) 100-25 MCG/INH AEPB    Inhale 1 Dose into the lungs daily for 30 days.     METOPROLOL (TOPROL-XL) 100 MG XL TABLET    Take 100 mg by mouth daily     MORPHINE 10 MG/5ML SOLUTION    Take by mouth every 2 hours as needed for Pain. Romesharon Luison NITROGLYCERIN (NITROSTAT) 0.4 MG SL TABLET    Place 0.4 mg under the tongue every 5 minutes as needed for Chest pain    SENNOSIDES-DOCUSATE SODIUM (SENNA S PO)    Take by mouth    SPIRIVA RESPIMAT 2.5 MCG/ACT AERS    Inhale 1 puff into the lungs daily    TAMSULOSIN (FLOMAX) 0.4 MG CAPSULE    Take 1 capsule by mouth daily    VENTOLIN  (90 BASE) MCG/ACT INHALER    Inhale into the lungs daily       ALLERGIES     is allergic to imdur [isosorbide dinitrate]; lisinopril; and statins. FAMILY HISTORY     [unfilled]     SOCIAL HISTORY      reports that he quit smoking about 6 years ago. He has a 15.00 pack-year smoking history. He has never used smokeless tobacco. He reports that he drinks alcohol. He reports that he does not use drugs. PHYSICAL EXAM     INITIAL VITALS: /82   Pulse 111   Temp 98.8 °F (37.1 °C) (Temporal)   Resp 26   Ht 5' 8\" (1.727 m)   Wt 110 lb (49.9 kg)   SpO2 95%   BMI 16.73 kg/m²      Physical Exam   Constitutional: He is oriented to person, place, and time. He appears well-developed and well-nourished. No distress. HENT:   Head: Normocephalic and atraumatic. Eyes: Pupils are equal, round, and reactive to light. Conjunctivae and EOM are normal. Right eye exhibits no discharge. Left eye exhibits no discharge. No scleral icterus. Cardiovascular: Normal rate, regular rhythm and normal heart sounds. Exam reveals no gallop and no friction rub. No murmur heard. Pulmonary/Chest: Accessory muscle usage present. Tachypnea noted. He is in respiratory distress. He has decreased breath sounds. He has wheezes. He has no rhonchi. He has no rales. He exhibits no tenderness. Abdominal: Soft. Bowel sounds are normal. He exhibits no distension and no mass. There is no tenderness. There is no rebound and no guarding.    Musculoskeletal: Normal range of

## 2018-12-03 NOTE — SIGNIFICANT EVENT
Jose Maria Miranda MD   Physician   Cardiology   Progress Notes   Signed   Encounter Date:  5/31/2018               Signed        Expand All Collapse All            []Hide copied text    []Ewa for details      Hallie Terry     Date of visit: 5/31/2018                                             YOB: 1955  Age: 58 y.o. MRN: [id number]  _______________________      Patient Active Problem List   Diagnosis    Other hyperlipidemia - intolerant to Statins due to leg myalgias    Rheumatoid arteritis    Cancer ([facility] [facility]    COPD (chronic obstructive pulmonary disease) ([facility] SEVERE    Pulmonary emphysema (HCC)    Fatigue    PVD (peripheral vascular disease) ([facility])    Old MI (myocardial infarction)    Angina pectoris ([facility])    Benign essential hypertension    Abnormal electrocardiography    Secondary pulmonary hypertension    Shortness of breath WORSENING    Coronary artery disease involving native coronary artery of native heart without angina pectoris    Presence of drug coated stent in anterior descending branch of left coronary artery Mar 2016    Presence of drug coated stent in left circumflex coronary artery 2009    Maxillary sinus polyp      _______________________        Allergies   Allergen Reactions    Atorvastatin         Other reaction(s): Myopathy or muscle pain    Statins-Hmg-Coa Reductase Inhibitors         Myalgia      _______________________  Current Medications          Current Outpatient Prescriptions   Medication Sig Dispense Refill    albuterol (ACCUNEB) 0.63 mg/3 mL nebulizer solution as needed.        amLODIPine (NORVASC) 10 mg tablet Take 1 tablet (10 mg total) by mouth once daily.  90 tablet 2    aspirin 81 mg Take 81 mg by mouth daily.        escitalopram (LEXAPRO) 10 mg tablet Take 10 mg by mouth as needed.        fluticasone-vilanterol (BREO ELLIPTA) 100-25 mcg/dose blister with device daily.           _______________________  Social History               Social History    Marital status:        Spouse name: N/A    Number of children: N/A    Years of education: N/A          Occupational History    Not on file.           Social History Main Topics    Smoking status: Former Smoker    Smokeless tobacco: Never Used    Alcohol use No    Drug use: No    Sexual activity: Not on file           Other Topics Concern    Caffeine Use Yes          Social History Narrative    No narrative on file         _______________________  Review of Systems  Review of Systems   Cardiovascular: Positive for dyspnea on exertion. Negative for chest pain and leg swelling. Tightness in both arm pain in the left arm   Respiratory: Positive for shortness of breath. Skin: Negative for itching and rash. Musculoskeletal: Positive for arthritis, back pain, joint pain and stiffness. Gastrointestinal: Positive for nausea. Negative for bloating and abdominal pain.    Neurological: Negative for excessive daytime sleepiness, dizziness and headaches.      CARDIOVASCULAR: Please review HPI.  _______________________  Physical Examination  CONSTITUTIONAL: cooperative, alert and oriented, well developed, well nourished, in no acute distress, wife Aileen Larios here - chronically ill appearing male  SKIN: warm and dry to touch  NECK: no JVD  CHEST: clear but decreased air entry   CARDIAC: regular rhythm, no S3 or S4, no significant audible murmur  PERIPHERAL PULSES: bilateral radial pulses full and equal   EXTREMITIES and BACK: no cyanosis present, no clubbing present, no edema present  PSYCHIATRIC: appropriate mood, memory and judgement  NEUROLOGICAL: able to move all 4 extremities  _______________________  VITAL SIGNS:  /84 (BP Site: Left Arm, BP Postition: Sitting, BP CUFF SIZE: S (7-9 inches))   Pulse 84   Ht 170.2 cm (5' 7\")   Wt 59 kg (130 lb)   BMI 20.36 kg/m²   _______________________       Orders Placed This Encounter   Medications    escitalopram (LEXAPRO) 10 mg tablet       Sig: Take 10 mg by mouth as needed.  nitroglycerin (NITROSTAT) 0.4 MG SL tablet       Sig: Place 1 tablet (0.4 mg total) under the tongue as needed for chest pain.       Dispense:  25 tablet       Refill:  3           Medications Discontinued During This Encounter   Medication Reason    nitroglycerin (NITROSTAT) 0.4 MG SL tablet Reorder      _______________________  IMPRESSIONS/PLAN       Encounter Diagnoses   Name Primary?  Angina pectoris ([facility] [address] Yes    Coronary artery disease involving native coronary artery of native heart without angina pectoris      Presence of drug coated stent in anterior descending branch of left coronary artery Mar 2016      Presence of drug coated stent in left circumflex coronary artery 2009      Shortness of breath WORSENING        Angina pectoris, Atherosclerotic coronary artery disease, MOMO of LCX in 2009, MOMO of prox LAD Mar 2016 (stopped Brilinta in Aug 2017 due to cost issues), LVEF 55%, mild pulmonary hypertension, Hyperlipidemia - intolerant to Statins due to leg myalgias, severe exertional dyspnea due to severe Advanced Emphysema & COPD - uses Home O2/NC always at night and PRN daytime - quit cigarette smoking July 2017, cancer of lung and status post right upper lobectomy - sees Dr Sarah Butterfield ([facility]), chronic dry cough, chronic fatigue, other problems as charted.  No CHF or syncope.        EKG today - NSR, Short LA interval, RsR', no acte EKG changes.     Advised to use NTG SL as needed and refilled today. Gets Headache only with Isosorbide and no true allergy.     Can not afford Ranexa - stating \"already in a Donut hole\".    Also reminded to seek immediate medical attention if any worsening cardiac symptoms. May need cardiac cath at that time.     Continue all current cardiac meds.     No plans for cardiac cath or stress test (due to bad lungs) - at this time.     Overall POOR PROGNOSIS.   Patient &

## 2018-12-04 LAB
ANION GAP SERPL CALCULATED.3IONS-SCNC: 7 MMOL/L (ref 9–17)
BUN BLDV-MCNC: 19 MG/DL (ref 8–23)
BUN/CREAT BLD: ABNORMAL (ref 9–20)
CALCIUM SERPL-MCNC: 9.4 MG/DL (ref 8.6–10.4)
CHLORIDE BLD-SCNC: 92 MMOL/L (ref 98–107)
CO2: 37 MMOL/L (ref 20–31)
CREAT SERPL-MCNC: 0.63 MG/DL (ref 0.7–1.2)
GFR AFRICAN AMERICAN: >60 ML/MIN
GFR NON-AFRICAN AMERICAN: >60 ML/MIN
GFR SERPL CREATININE-BSD FRML MDRD: ABNORMAL ML/MIN/{1.73_M2}
GFR SERPL CREATININE-BSD FRML MDRD: ABNORMAL ML/MIN/{1.73_M2}
GLUCOSE BLD-MCNC: 141 MG/DL (ref 70–99)
POTASSIUM SERPL-SCNC: 3.8 MMOL/L (ref 3.7–5.3)
POTASSIUM SERPL-SCNC: 5.6 MMOL/L (ref 3.7–5.3)
SODIUM BLD-SCNC: 136 MMOL/L (ref 135–144)

## 2018-12-04 PROCEDURE — 84132 ASSAY OF SERUM POTASSIUM: CPT

## 2018-12-04 PROCEDURE — 6360000002 HC RX W HCPCS: Performed by: INTERNAL MEDICINE

## 2018-12-04 PROCEDURE — 99222 1ST HOSP IP/OBS MODERATE 55: CPT | Performed by: FAMILY MEDICINE

## 2018-12-04 PROCEDURE — 94761 N-INVAS EAR/PLS OXIMETRY MLT: CPT

## 2018-12-04 PROCEDURE — 1200000000 HC SEMI PRIVATE

## 2018-12-04 PROCEDURE — 2700000000 HC OXYGEN THERAPY PER DAY

## 2018-12-04 PROCEDURE — 6360000002 HC RX W HCPCS: Performed by: FAMILY MEDICINE

## 2018-12-04 PROCEDURE — 6370000000 HC RX 637 (ALT 250 FOR IP): Performed by: INTERNAL MEDICINE

## 2018-12-04 PROCEDURE — 6370000000 HC RX 637 (ALT 250 FOR IP): Performed by: FAMILY MEDICINE

## 2018-12-04 PROCEDURE — 94640 AIRWAY INHALATION TREATMENT: CPT

## 2018-12-04 PROCEDURE — 36415 COLL VENOUS BLD VENIPUNCTURE: CPT

## 2018-12-04 PROCEDURE — 80048 BASIC METABOLIC PNL TOTAL CA: CPT

## 2018-12-04 PROCEDURE — 2580000003 HC RX 258: Performed by: FAMILY MEDICINE

## 2018-12-04 RX ORDER — SCOLOPAMINE TRANSDERMAL SYSTEM 1 MG/1
1 PATCH, EXTENDED RELEASE TRANSDERMAL
Status: DISCONTINUED | OUTPATIENT
Start: 2018-12-04 | End: 2018-12-07 | Stop reason: HOSPADM

## 2018-12-04 RX ADMIN — BUSPIRONE HYDROCHLORIDE 10 MG: 5 TABLET ORAL at 08:01

## 2018-12-04 RX ADMIN — MOMETASONE FUROATE AND FORMOTEROL FUMARATE DIHYDRATE 2 PUFF: 200; 5 AEROSOL RESPIRATORY (INHALATION) at 08:00

## 2018-12-04 RX ADMIN — ALBUTEROL SULFATE 2.5 MG: 2.5 SOLUTION RESPIRATORY (INHALATION) at 19:14

## 2018-12-04 RX ADMIN — MOMETASONE FUROATE AND FORMOTEROL FUMARATE DIHYDRATE 2 PUFF: 200; 5 AEROSOL RESPIRATORY (INHALATION) at 19:59

## 2018-12-04 RX ADMIN — ALBUTEROL SULFATE 2.5 MG: 2.5 SOLUTION RESPIRATORY (INHALATION) at 12:19

## 2018-12-04 RX ADMIN — GUAIFENESIN 600 MG: 600 TABLET, EXTENDED RELEASE ORAL at 19:59

## 2018-12-04 RX ADMIN — AMLODIPINE BESYLATE 10 MG: 10 TABLET ORAL at 08:01

## 2018-12-04 RX ADMIN — Medication 10 ML: at 08:07

## 2018-12-04 RX ADMIN — DILTIAZEM HYDROCHLORIDE 30 MG: 30 TABLET, FILM COATED ORAL at 20:00

## 2018-12-04 RX ADMIN — METHYLPREDNISOLONE SODIUM SUCCINATE 60 MG: 125 INJECTION, POWDER, FOR SOLUTION INTRAMUSCULAR; INTRAVENOUS at 08:02

## 2018-12-04 RX ADMIN — TIOTROPIUM BROMIDE 18 MCG: 18 CAPSULE ORAL; RESPIRATORY (INHALATION) at 08:00

## 2018-12-04 RX ADMIN — ASPIRIN 81 MG: 81 TABLET, COATED ORAL at 08:01

## 2018-12-04 RX ADMIN — GUAIFENESIN 600 MG: 600 TABLET, EXTENDED RELEASE ORAL at 08:01

## 2018-12-04 RX ADMIN — ESCITALOPRAM OXALATE 20 MG: 20 TABLET ORAL at 08:01

## 2018-12-04 RX ADMIN — ALBUTEROL SULFATE 2.5 MG: 2.5 SOLUTION RESPIRATORY (INHALATION) at 15:55

## 2018-12-04 RX ADMIN — METHYLPREDNISOLONE SODIUM SUCCINATE 60 MG: 125 INJECTION, POWDER, FOR SOLUTION INTRAMUSCULAR; INTRAVENOUS at 02:05

## 2018-12-04 RX ADMIN — MORPHINE SULFATE 6 MG: 10 SOLUTION ORAL at 19:59

## 2018-12-04 RX ADMIN — DILTIAZEM HYDROCHLORIDE 30 MG: 30 TABLET, FILM COATED ORAL at 15:15

## 2018-12-04 RX ADMIN — MORPHINE SULFATE 6 MG: 10 SOLUTION ORAL at 04:51

## 2018-12-04 RX ADMIN — ALBUTEROL SULFATE 2.5 MG: 2.5 SOLUTION RESPIRATORY (INHALATION) at 07:09

## 2018-12-04 RX ADMIN — SENNOSIDES AND DOCUSATE SODIUM 1 TABLET: 8.6; 5 TABLET ORAL at 19:59

## 2018-12-04 RX ADMIN — Medication 10 ML: at 19:59

## 2018-12-04 RX ADMIN — METOPROLOL SUCCINATE 100 MG: 100 TABLET, EXTENDED RELEASE ORAL at 08:01

## 2018-12-04 RX ADMIN — TAMSULOSIN HYDROCHLORIDE 0.4 MG: 0.4 CAPSULE ORAL at 08:01

## 2018-12-04 RX ADMIN — ENOXAPARIN SODIUM 30 MG: 30 INJECTION SUBCUTANEOUS at 08:00

## 2018-12-04 RX ADMIN — Medication 10 ML: at 17:21

## 2018-12-04 RX ADMIN — METHYLPREDNISOLONE SODIUM SUCCINATE 60 MG: 125 INJECTION, POWDER, FOR SOLUTION INTRAMUSCULAR; INTRAVENOUS at 17:21

## 2018-12-04 RX ADMIN — MORPHINE SULFATE 6 MG: 10 SOLUTION ORAL at 08:00

## 2018-12-04 ASSESSMENT — PAIN DESCRIPTION - PAIN TYPE: TYPE: ACUTE PAIN

## 2018-12-04 ASSESSMENT — ENCOUNTER SYMPTOMS
SINUS PRESSURE: 0
RHINORRHEA: 0
CONSTIPATION: 1
VOMITING: 0
ABDOMINAL PAIN: 0
CHEST TIGHTNESS: 1
NAUSEA: 0
SHORTNESS OF BREATH: 1
EYE DISCHARGE: 0
TROUBLE SWALLOWING: 0
COUGH: 1
WHEEZING: 1
EYE ITCHING: 0
DIARRHEA: 0

## 2018-12-04 ASSESSMENT — PAIN SCALES - GENERAL
PAINLEVEL_OUTOF10: 4
PAINLEVEL_OUTOF10: 8
PAINLEVEL_OUTOF10: 0
PAINLEVEL_OUTOF10: 2
PAINLEVEL_OUTOF10: 0
PAINLEVEL_OUTOF10: 2
PAINLEVEL_OUTOF10: 4

## 2018-12-04 ASSESSMENT — PAIN DESCRIPTION - DESCRIPTORS: DESCRIPTORS: ACHING

## 2018-12-04 ASSESSMENT — PAIN DESCRIPTION - LOCATION: LOCATION: BACK

## 2018-12-04 ASSESSMENT — PAIN DESCRIPTION - ORIENTATION: ORIENTATION: RIGHT

## 2018-12-04 NOTE — H&P
Social History     Social History    Marital status:      Spouse name: N/A    Number of children: N/A    Years of education: N/A     Occupational History    retired [employer]     Social History Main Topics    Smoking status: Former Smoker     Packs/day: 0.50     Years: 30.00     Quit date: 12/1/2012    Smokeless tobacco: Never Used    Alcohol use Yes      Comment: rare    Drug use: No    Sexual activity: Not Asked     Other Topics Concern    None     Social History Narrative    None           REVIEW OF SYSTEMS      Allergies   Allergen Reactions    Imdur [Isosorbide Dinitrate]     Lisinopril Swelling    Statins      Myalgia         No current facility-administered medications on file prior to encounter. Current Outpatient Prescriptions on File Prior to Encounter   Medication Sig Dispense Refill    escitalopram (LEXAPRO) 10 MG tablet Take 1 tablet by mouth daily (Patient taking differently: Take 20 mg by mouth daily ) 30 tablet 11    VENTOLIN  (90 BASE) MCG/ACT inhaler Inhale into the lungs daily      amLODIPine (NORVASC) 10 MG tablet Take 1 tablet by mouth daily      SPIRIVA RESPIMAT 2.5 MCG/ACT AERS Inhale 1 puff into the lungs daily      metoprolol (TOPROL-XL) 100 MG XL tablet Take 100 mg by mouth daily       albuterol (PROVENTIL) (2.5 MG/3ML) 0.083% nebulizer solution Take by nebulization daily      nitroGLYCERIN (NITROSTAT) 0.4 MG SL tablet Place 0.4 mg under the tongue every 5 minutes as needed for Chest pain      Fluticasone Furoate-Vilanterol (BREO ELLIPTA) 100-25 MCG/INH AEPB Inhale 1 Dose into the lungs daily for 30 days. 1 each 6    tamsulosin (FLOMAX) 0.4 MG capsule Take 1 capsule by mouth daily 30 capsule 5    ASPIR-LOW 81 MG EC tablet Take 1 tablet by mouth daily          Review of Systems   Constitutional: Positive for activity change, appetite change, chills and fatigue.    HENT: Negative for congestion, rhinorrhea, sinus pressure and trouble swallowing. Eyes: Positive for visual disturbance. Negative for discharge and itching. Respiratory: Positive for cough, chest tightness, shortness of breath and wheezing. Cardiovascular: Positive for leg swelling. Negative for palpitations. Gastrointestinal: Positive for constipation. Negative for abdominal pain, diarrhea, nausea and vomiting. Genitourinary: Negative for difficulty urinating, hematuria and urgency. Musculoskeletal: Negative for arthralgias, joint swelling and myalgias. Neurological: Positive for tremors. Negative for dizziness, seizures and headaches. Psychiatric/Behavioral: Negative. GENERAL PHYSICAL EXAM:         Vitals: BP (!) 140/82   Pulse 98   Temp 98 °F (36.7 °C) (Oral)   Resp 18   Ht 5' 8\" (1.727 m)   Wt 106 lb 4.2 oz (48.2 kg)   SpO2 94%   BMI 16.16 kg/m²  Body mass index is 16.16 kg/m². GENERAL APPEARANCE:  Alisson Cook is 61 y.o.,  male, thin, nourished, conscious, alert. Does not appear to be distress or pain at this time. SKIN:  Warm, dry, no cyanosis or jaundice. HEAD:  Normocephalic, atraumatic, no swelling or tenderness. EYES: Wears glasses. Pupils equal, reactive to light, Conjunctiva is clear, EOMs intact jason. eyelids WNL. EARS:  No discharge, no marked hearing loss. NOSE:  No rhinorrhea, epistaxis or septal deformity. THROAT:  Dentures present. Not congested. No ulceration bleeding or discharge. NECK:  No stiffness, trachea central.       CHEST:  Symmetrical and equal on expansion. HEART:  Tachycardic rate, 102 bpm, and regular rhythm. S1 > S2, No audible murmurs or gallops. LUNGS:  Equal on expansion, diminished throughout all lung fields. No wheezing or rhonchi. ABDOMEN:  Soft on palpation. No localized tenderness. No guarding or rigidity. LOCOMOTOR, BACK AND SPINE:  No tenderness or deformities. EXTREMITIES:  Symmetrical, no pedal edema. Alfas sign negative.   No discoloration

## 2018-12-04 NOTE — CONSULTS
.. PALLIATIVE CARE NURSING ASSESSMENT    Patient: Anyi Craven  Room: 2060/2060-01    Reason For Consult   Goals of care evaluation  Distress management  Guidance and support  Facilitate communications  Assistance in coordinating care      Impression: Anyi Craven is a 61y.o. year old male  has a past medical history of Arthritis; CAD (coronary artery disease); Cataract; COPD (chronic obstructive pulmonary disease) ([facility] Hypertension; Inguinal hernia; Lung cancer [facility]); MI (myocardial infarction) ([facility] Oxygen dependent; SOB (shortness of breath) on exertion; Vascular disease; and Wears glasses. .  Currently hospitalized for the management of SOB/COPD exacerbation. The Palliative Care Team is following to assist with chronic disease support. Vital Signs  Blood pressure 112/62, pulse 98, temperature 98.1 °F (36.7 °C), temperature source Oral, resp. rate 18, height 5' 8\" (1.727 m), weight 106 lb 4.2 oz (48.2 kg), SpO2 96 %. Patient Active Problem List   Diagnosis    Malignant neoplasm of lung ([facility]    COPD exacerbation (HCC)    Acute bronchitis    Coronary artery disease involving native coronary artery of native heart without angina pectoris    Maxillary sinus polyp    Pneumothorax after biopsy    Shortness of breath    Benign prostatic hyperplasia with urinary frequency       Palliative Interaction:  Pt sitting up in bed when I arrived. He is alert and oriented and pleasant. He is SOB slightly with talking but appears in no distress overall. Pt is receiving palliative care services at home [facility] and state they come to see him \"every couple weeks\". He is happy with their services and will continue when he goes home. We talked about his quality of life at home and he states, \"It is going down\". Pt states he used to be able to help himself and walk around but now he has \"gotten worse\". We talked about hospice and I tried to dispell any misconceptions associated with that.  I explained that

## 2018-12-04 NOTE — PLAN OF CARE
Problem: Falls - Risk of:  Goal: Will remain free from falls  Will remain free from falls   Outcome: Ongoing  Patient has been free from falls this shift. Call light is within reach, side rails up x2, bed in lowest position.

## 2018-12-04 NOTE — CONSULTS
[facility] PHYS[facility]IANS CARDIOLOGY CONSULT NOTE      Date of Admission:  12/3/2018    Date of Consultation:  12/4/2018    PCP:  Renetta High MD    REASON FOR CONSULT: tachycardia. History of Present Illness:  Alisson Cook is a 61 y.o. male who presents with  Shortness of breath and bilateral lower extremity edema for last 3 days since Sunday upon admission which has since resolved the since admission apparently. Denied any chest pain or palpitation or lightheadedness or dizziness or syncope. Patient was most recently seen by me on 05/31/2018 and he has prior history of CAD, stent of left circumflex artery in 2009, stent of proximal LAD in March 2016, stable angina pectoris, normal LVEF, pulmonary hypertension, hyperlipidemia with intolerance to statins due to myalgia as involving legs, chronic severe exertional dyspnea due to severe advanced emphysema and COPD and uses home oxygen, cancer of the lung and status post right upper lobectomy, chronic tobacco abuse currently still smoking half pack per day. PMH:   has a past medical history of Arthritis; CAD (coronary artery disease); Cataract; COPD (chronic obstructive pulmonary disease) ([facility] [address] Hypertension; Inguinal hernia; Lung cancer [facility]); MI (myocardial infarction) ([facility] [address] Oxygen dependent; SOB (shortness of breath) on exertion; Vascular disease; and Wears glasses. PSH:   has a past surgical history that includes Coronary angioplasty with stent (2009); other surgical history (2002); Cataract removal; Inguinal hernia repair (2012); lobectomy (12/17/12); back surgery (cervical fusion); Upper gastrointestinal endoscopy (6/2/14); Inguinal hernia repair (Right, 8/28/14); Inguinal hernia repair (Right, 8/28/14); Colonoscopy; Neck surgery; and eye surgery. Allergies:     Allergies   Allergen Reactions    Imdur [Isosorbide Dinitrate]     Lisinopril Swelling    Statins      Myalgia          Home Meds:    Prior to Admission

## 2018-12-04 NOTE — CONSULTS
Current Facility-Administered Medications   Medication Dose Route Frequency Provider Last Rate Last Dose    aspirin EC tablet 81 mg  81 mg Oral Daily Jessica Lipscomb MD        albuterol (PROVENTIL) nebulizer solution 2.5 mg  2.5 mg Nebulization 4x daily Jessica Lipscomb MD   2.5 mg at 12/03/18 1534    amLODIPine (NORVASC) tablet 10 mg  10 mg Oral Daily Jessica Lipscomb MD   10 mg at 12/03/18 1254    busPIRone (BUSPAR) tablet 10 mg  10 mg Oral Daily Jessica Lipscomb MD   10 mg at 12/03/18 1254    escitalopram (LEXAPRO) tablet 20 mg  20 mg Oral Daily Jessica Lipscomb MD   20 mg at 12/03/18 1254    mometasone-formoterol (DULERA) 200-5 MCG/ACT inhaler 2 puff  2 puff Inhalation BID Jessica Lipscomb MD   2 puff at 12/03/18 1254    metoprolol succinate (TOPROL XL) extended release tablet 100 mg  100 mg Oral Daily Jessica Lipscomb MD   100 mg at 12/03/18 1255    morphine 10 MG/5ML solution 6 mg  6 mg Oral Q2H PRN Jessica Lipscomb MD   6 mg at 12/03/18 1534    nitroGLYCERIN (NITROSTAT) SL tablet 0.4 mg  0.4 mg Sublingual Q5 Min PRN Jessica Lipscomb MD        sennosides-docusate sodium (SENOKOT-S) 8.6-50 MG tablet 1 tablet  1 tablet Oral Nightly Jessica Lipscomb MD        tiotropium [facility]) inhalation capsule 18 mcg  18 mcg Inhalation Daily Jessica Lipscomb MD   18 mcg at 12/03/18 1254    tamsulosin (FLOMAX) capsule 0.4 mg  0.4 mg Oral Daily Jessica Lipscomb MD   0.4 mg at 12/03/18 1254    sodium chloride flush 0.9 % injection 10 mL  10 mL Intravenous 2 times per day Jessica Lipscomb MD   10 mL at 12/03/18 1308    sodium chloride flush 0.9 % injection 10 mL  10 mL Intravenous PRN Jessica Lipscomb MD        magnesium hydroxide (MILK OF MAGNESIA) 400 MG/5ML suspension 30 mL  30 mL Oral Daily PRN Jessica Lipscomb MD        ondansetron [facility]) injection 4 mg  4 mg Intravenous Q6H PRN Jessica Lipscomb MD        enoxaparin (LOVENOX) injection 30 mg GASTROINTESTINAL ENDOSCOPY  6/2/14    polyp biopsy, Dr Jin Juares       Allergies: Allergies   Allergen Reactions    Imdur [Isosorbide Dinitrate]     Lisinopril Swelling    Statins      Myalgia         Home Meds:  Prescriptions Prior to Admission: Sennosides-Docusate Sodium (SENNA S PO), Take by mouth  busPIRone (BUSPAR) 10 MG tablet, Take 10 mg by mouth daily  morphine 10 MG/5ML solution, Take by mouth every 2 hours as needed for Pain. Jasmyn Rachel escitalopram (LEXAPRO) 10 MG tablet, Take 1 tablet by mouth daily (Patient taking differently: Take 20 mg by mouth daily )  VENTOLIN  (90 BASE) MCG/ACT inhaler, Inhale into the lungs daily  amLODIPine (NORVASC) 10 MG tablet, Take 1 tablet by mouth daily  SPIRIVA RESPIMAT 2.5 MCG/ACT AERS, Inhale 1 puff into the lungs daily  metoprolol (TOPROL-XL) 100 MG XL tablet, Take 100 mg by mouth daily   albuterol (PROVENTIL) (2.5 MG/3ML) 0.083% nebulizer solution, Take by nebulization daily  nitroGLYCERIN (NITROSTAT) 0.4 MG SL tablet, Place 0.4 mg under the tongue every 5 minutes as needed for Chest pain  Fluticasone Furoate-Vilanterol (BREO ELLIPTA) 100-25 MCG/INH AEPB, Inhale 1 Dose into the lungs daily for 30 days.   tamsulosin (FLOMAX) 0.4 MG capsule, Take 1 capsule by mouth daily  ASPIR-LOW 81 MG EC tablet, Take 1 tablet by mouth daily    Social History:   Social History     Social History    Marital status:      Spouse name: N/A    Number of children: N/A    Years of education: N/A     Occupational History    retired [employer]     Social History Main Topics    Smoking status: Former Smoker     Packs/day: 0.50     Years: 30.00     Quit date: 12/1/2012    Smokeless tobacco: Never Used    Alcohol use Yes      Comment: rare    Drug use: No    Sexual activity: Not on file     Other Topics Concern    Not on file     Social History Narrative    No narrative on file       Family History:   Family History   Problem Relation Age of Onset    Heart Disease

## 2018-12-04 NOTE — FLOWSHEET NOTE
12/04/18 1244   Encounter Summary   Services provided to: Patient   Referral/Consult From: [place]   Continue Visiting (12/4/18V)   Volunteer Visit Yes   Complexity of Encounter Low   Length of Encounter 15 minutes   Routine   Type Follow up   Spiritual/Religion   Type Spiritual support   Intervention Prayer

## 2018-12-04 NOTE — CARE COORDINATION
[address] Transitions Interview     2018    Patient: Denita Fischer Patient : 1955   MRN: [id number]  Reason for Admission: There are no discharge diagnoses documented for the most recent discharge. RARS: Readmission Risk Score: 15       Spoke with: Nitesh Aguillon met with patient, explained role of CTC, provided contact information, patient stated he is currently receiving care from [facility] palliative care, would like to continue with them. Patient is open to vns if needed, will continue to follow//JU      Readmission Risk  Patient Active Problem List   Diagnosis    [facility]ignant [facility] ([facility]    COPD exacerbation ([facility]    Acute bronchitis    Coronary artery disease involving native coronary artery of native heart without angina pectoris    Maxillary sinus polyp    Pneumothorax after biopsy    Shortness of breath    Benign prostatic hyperplasia with urinary frequency       Inpatient Assessment  Care Transitions Summary    Care Transitions Inpatient Review  Medication Review  Do you have all of your prescriptions and are they filled?:  Yes   Housing Review  [facility] the person that you care for [facility]  No  Social Support  Durable Medical Equipment  Functional Review  Hearing and Vision  Care Transitions Interventions         Follow Up  No future appointments. Health Maintenance  There are no preventive care reminders to display for this patient.     Carli Sandoval RN

## 2018-12-05 LAB
ANION GAP SERPL CALCULATED.3IONS-SCNC: 10 MMOL/L (ref 9–17)
BUN BLDV-MCNC: 21 MG/DL (ref 8–23)
BUN/CREAT BLD: ABNORMAL (ref 9–20)
CALCIUM SERPL-MCNC: 9.6 MG/DL (ref 8.6–10.4)
CHLORIDE BLD-SCNC: 91 MMOL/L (ref 98–107)
CO2: 35 MMOL/L (ref 20–31)
CREAT SERPL-MCNC: 0.66 MG/DL (ref 0.7–1.2)
GFR AFRICAN AMERICAN: >60 ML/MIN
GFR NON-AFRICAN AMERICAN: >60 ML/MIN
GFR SERPL CREATININE-BSD FRML MDRD: ABNORMAL ML/MIN/{1.73_M2}
GFR SERPL CREATININE-BSD FRML MDRD: ABNORMAL ML/MIN/{1.73_M2}
GLUCOSE BLD-MCNC: 142 MG/DL (ref 70–99)
POTASSIUM SERPL-SCNC: 4.1 MMOL/L (ref 3.7–5.3)
POTASSIUM SERPL-SCNC: 5.6 MMOL/L (ref 3.7–5.3)
SODIUM BLD-SCNC: 136 MMOL/L (ref 135–144)
TROPONIN INTERP: NORMAL
TROPONIN T: <0.03 NG/ML

## 2018-12-05 PROCEDURE — 2700000000 HC OXYGEN THERAPY PER DAY

## 2018-12-05 PROCEDURE — 6360000002 HC RX W HCPCS: Performed by: FAMILY MEDICINE

## 2018-12-05 PROCEDURE — 6370000000 HC RX 637 (ALT 250 FOR IP): Performed by: INTERNAL MEDICINE

## 2018-12-05 PROCEDURE — 84484 ASSAY OF TROPONIN QUANT: CPT

## 2018-12-05 PROCEDURE — 94761 N-INVAS EAR/PLS OXIMETRY MLT: CPT

## 2018-12-05 PROCEDURE — 94640 AIRWAY INHALATION TREATMENT: CPT

## 2018-12-05 PROCEDURE — 6370000000 HC RX 637 (ALT 250 FOR IP): Performed by: FAMILY MEDICINE

## 2018-12-05 PROCEDURE — 1200000000 HC SEMI PRIVATE

## 2018-12-05 PROCEDURE — 6360000002 HC RX W HCPCS: Performed by: INTERNAL MEDICINE

## 2018-12-05 PROCEDURE — 36415 COLL VENOUS BLD VENIPUNCTURE: CPT

## 2018-12-05 PROCEDURE — 99232 SBSQ HOSP IP/OBS MODERATE 35: CPT | Performed by: FAMILY MEDICINE

## 2018-12-05 PROCEDURE — 84132 ASSAY OF SERUM POTASSIUM: CPT

## 2018-12-05 PROCEDURE — 2580000003 HC RX 258: Performed by: FAMILY MEDICINE

## 2018-12-05 PROCEDURE — 80048 BASIC METABOLIC PNL TOTAL CA: CPT

## 2018-12-05 RX ORDER — MORPHINE SULFATE 10 MG/5ML
6 SOLUTION ORAL
Qty: 1 ML | Refills: 0 | Status: CANCELLED
Start: 2018-12-05 | End: 2019-01-04

## 2018-12-05 RX ADMIN — METHYLPREDNISOLONE SODIUM SUCCINATE 60 MG: 125 INJECTION, POWDER, FOR SOLUTION INTRAMUSCULAR; INTRAVENOUS at 17:01

## 2018-12-05 RX ADMIN — DILTIAZEM HYDROCHLORIDE 30 MG: 30 TABLET, FILM COATED ORAL at 16:07

## 2018-12-05 RX ADMIN — TIOTROPIUM BROMIDE 18 MCG: 18 CAPSULE ORAL; RESPIRATORY (INHALATION) at 07:36

## 2018-12-05 RX ADMIN — Medication 10 ML: at 21:32

## 2018-12-05 RX ADMIN — MORPHINE SULFATE 6 MG: 10 SOLUTION ORAL at 21:32

## 2018-12-05 RX ADMIN — ALBUTEROL SULFATE 2.5 MG: 2.5 SOLUTION RESPIRATORY (INHALATION) at 06:54

## 2018-12-05 RX ADMIN — TAMSULOSIN HYDROCHLORIDE 0.4 MG: 0.4 CAPSULE ORAL at 07:42

## 2018-12-05 RX ADMIN — BUSPIRONE HYDROCHLORIDE 10 MG: 5 TABLET ORAL at 07:42

## 2018-12-05 RX ADMIN — GUAIFENESIN 600 MG: 600 TABLET, EXTENDED RELEASE ORAL at 21:32

## 2018-12-05 RX ADMIN — DILTIAZEM HYDROCHLORIDE 30 MG: 30 TABLET, FILM COATED ORAL at 06:27

## 2018-12-05 RX ADMIN — GUAIFENESIN 600 MG: 600 TABLET, EXTENDED RELEASE ORAL at 07:42

## 2018-12-05 RX ADMIN — MORPHINE SULFATE 6 MG: 10 SOLUTION ORAL at 07:46

## 2018-12-05 RX ADMIN — ASPIRIN 81 MG: 81 TABLET, COATED ORAL at 07:42

## 2018-12-05 RX ADMIN — ESCITALOPRAM OXALATE 20 MG: 20 TABLET ORAL at 07:42

## 2018-12-05 RX ADMIN — SENNOSIDES AND DOCUSATE SODIUM 1 TABLET: 8.6; 5 TABLET ORAL at 21:32

## 2018-12-05 RX ADMIN — ENOXAPARIN SODIUM 30 MG: 30 INJECTION SUBCUTANEOUS at 07:41

## 2018-12-05 RX ADMIN — ALBUTEROL SULFATE 2.5 MG: 2.5 SOLUTION RESPIRATORY (INHALATION) at 15:10

## 2018-12-05 RX ADMIN — METOPROLOL SUCCINATE 100 MG: 100 TABLET, EXTENDED RELEASE ORAL at 07:42

## 2018-12-05 RX ADMIN — Medication 10 ML: at 11:15

## 2018-12-05 RX ADMIN — METHYLPREDNISOLONE SODIUM SUCCINATE 60 MG: 125 INJECTION, POWDER, FOR SOLUTION INTRAMUSCULAR; INTRAVENOUS at 01:53

## 2018-12-05 RX ADMIN — METHYLPREDNISOLONE SODIUM SUCCINATE 60 MG: 125 INJECTION, POWDER, FOR SOLUTION INTRAMUSCULAR; INTRAVENOUS at 11:11

## 2018-12-05 RX ADMIN — MOMETASONE FUROATE AND FORMOTEROL FUMARATE DIHYDRATE 2 PUFF: 200; 5 AEROSOL RESPIRATORY (INHALATION) at 07:36

## 2018-12-05 RX ADMIN — MOMETASONE FUROATE AND FORMOTEROL FUMARATE DIHYDRATE 2 PUFF: 200; 5 AEROSOL RESPIRATORY (INHALATION) at 21:32

## 2018-12-05 RX ADMIN — ALBUTEROL SULFATE 2.5 MG: 2.5 SOLUTION RESPIRATORY (INHALATION) at 11:07

## 2018-12-05 RX ADMIN — ALBUTEROL SULFATE 2.5 MG: 2.5 SOLUTION RESPIRATORY (INHALATION) at 20:56

## 2018-12-05 RX ADMIN — DILTIAZEM HYDROCHLORIDE 30 MG: 30 TABLET, FILM COATED ORAL at 21:32

## 2018-12-05 ASSESSMENT — PAIN DESCRIPTION - LOCATION: LOCATION: BACK

## 2018-12-05 ASSESSMENT — PAIN DESCRIPTION - ORIENTATION: ORIENTATION: RIGHT;LEFT

## 2018-12-05 ASSESSMENT — PAIN DESCRIPTION - DESCRIPTORS: DESCRIPTORS: ACHING

## 2018-12-05 ASSESSMENT — PAIN SCALES - GENERAL
PAINLEVEL_OUTOF10: 4
PAINLEVEL_OUTOF10: 0
PAINLEVEL_OUTOF10: 8
PAINLEVEL_OUTOF10: 0
PAINLEVEL_OUTOF10: 0

## 2018-12-05 ASSESSMENT — ENCOUNTER SYMPTOMS: SHORTNESS OF BREATH: 1

## 2018-12-05 ASSESSMENT — PAIN DESCRIPTION - PAIN TYPE: TYPE: CHRONIC PAIN

## 2018-12-05 NOTE — FLOWSHEET NOTE
Anointed fr [facility]     12/04/18 1900   Encounter Summary   Services provided to: Patient   Referral/Consult From: Clergy/   Continue Visiting (12/4/2018)   Complexity of Encounter Low   Length of Encounter 15 minutes   Sacraments   Sacrament of Sick-Anointing Anointed

## 2018-12-06 LAB
ANION GAP SERPL CALCULATED.3IONS-SCNC: 7 MMOL/L (ref 9–17)
BUN BLDV-MCNC: 24 MG/DL (ref 8–23)
BUN/CREAT BLD: ABNORMAL (ref 9–20)
CALCIUM SERPL-MCNC: 9.2 MG/DL (ref 8.6–10.4)
CHLORIDE BLD-SCNC: 98 MMOL/L (ref 98–107)
CO2: 35 MMOL/L (ref 20–31)
CREAT SERPL-MCNC: 0.51 MG/DL (ref 0.7–1.2)
EKG ATRIAL RATE: 102 BPM
EKG P AXIS: 88 DEGREES
EKG P-R INTERVAL: 112 MS
EKG Q-T INTERVAL: 340 MS
EKG QRS DURATION: 86 MS
EKG QTC CALCULATION (BAZETT): 443 MS
EKG R AXIS: 67 DEGREES
EKG T AXIS: 83 DEGREES
EKG VENTRICULAR RATE: 102 BPM
GFR AFRICAN AMERICAN: >60 ML/MIN
GFR NON-AFRICAN AMERICAN: >60 ML/MIN
GFR SERPL CREATININE-BSD FRML MDRD: ABNORMAL ML/MIN/{1.73_M2}
GFR SERPL CREATININE-BSD FRML MDRD: ABNORMAL ML/MIN/{1.73_M2}
GLUCOSE BLD-MCNC: 134 MG/DL (ref 70–99)
POTASSIUM SERPL-SCNC: 4.8 MMOL/L (ref 3.7–5.3)
SODIUM BLD-SCNC: 140 MMOL/L (ref 135–144)

## 2018-12-06 PROCEDURE — 6360000002 HC RX W HCPCS: Performed by: FAMILY MEDICINE

## 2018-12-06 PROCEDURE — 93005 ELECTROCARDIOGRAM TRACING: CPT

## 2018-12-06 PROCEDURE — 94761 N-INVAS EAR/PLS OXIMETRY MLT: CPT

## 2018-12-06 PROCEDURE — 2580000003 HC RX 258: Performed by: FAMILY MEDICINE

## 2018-12-06 PROCEDURE — 6370000000 HC RX 637 (ALT 250 FOR IP): Performed by: FAMILY MEDICINE

## 2018-12-06 PROCEDURE — 80048 BASIC METABOLIC PNL TOTAL CA: CPT

## 2018-12-06 PROCEDURE — 2700000000 HC OXYGEN THERAPY PER DAY

## 2018-12-06 PROCEDURE — 99231 SBSQ HOSP IP/OBS SF/LOW 25: CPT | Performed by: FAMILY MEDICINE

## 2018-12-06 PROCEDURE — 6360000002 HC RX W HCPCS: Performed by: INTERNAL MEDICINE

## 2018-12-06 PROCEDURE — 1200000000 HC SEMI PRIVATE

## 2018-12-06 PROCEDURE — 6370000000 HC RX 637 (ALT 250 FOR IP): Performed by: INTERNAL MEDICINE

## 2018-12-06 PROCEDURE — 94640 AIRWAY INHALATION TREATMENT: CPT

## 2018-12-06 PROCEDURE — 36415 COLL VENOUS BLD VENIPUNCTURE: CPT

## 2018-12-06 RX ADMIN — Medication 10 ML: at 17:00

## 2018-12-06 RX ADMIN — Medication 10 ML: at 12:09

## 2018-12-06 RX ADMIN — METHYLPREDNISOLONE SODIUM SUCCINATE 60 MG: 125 INJECTION, POWDER, FOR SOLUTION INTRAMUSCULAR; INTRAVENOUS at 16:59

## 2018-12-06 RX ADMIN — ALBUTEROL SULFATE 2.5 MG: 2.5 SOLUTION RESPIRATORY (INHALATION) at 07:29

## 2018-12-06 RX ADMIN — GUAIFENESIN 600 MG: 600 TABLET, EXTENDED RELEASE ORAL at 12:00

## 2018-12-06 RX ADMIN — MOMETASONE FUROATE AND FORMOTEROL FUMARATE DIHYDRATE 2 PUFF: 200; 5 AEROSOL RESPIRATORY (INHALATION) at 11:59

## 2018-12-06 RX ADMIN — DILTIAZEM HYDROCHLORIDE 30 MG: 30 TABLET, FILM COATED ORAL at 05:51

## 2018-12-06 RX ADMIN — MORPHINE SULFATE 6 MG: 10 SOLUTION ORAL at 21:46

## 2018-12-06 RX ADMIN — BUSPIRONE HYDROCHLORIDE 10 MG: 5 TABLET ORAL at 12:00

## 2018-12-06 RX ADMIN — METOPROLOL SUCCINATE 100 MG: 100 TABLET, EXTENDED RELEASE ORAL at 12:00

## 2018-12-06 RX ADMIN — METHYLPREDNISOLONE SODIUM SUCCINATE 60 MG: 125 INJECTION, POWDER, FOR SOLUTION INTRAMUSCULAR; INTRAVENOUS at 11:58

## 2018-12-06 RX ADMIN — TAMSULOSIN HYDROCHLORIDE 0.4 MG: 0.4 CAPSULE ORAL at 12:00

## 2018-12-06 RX ADMIN — ALBUTEROL SULFATE 2.5 MG: 2.5 SOLUTION RESPIRATORY (INHALATION) at 15:41

## 2018-12-06 RX ADMIN — TIOTROPIUM BROMIDE 18 MCG: 18 CAPSULE ORAL; RESPIRATORY (INHALATION) at 12:00

## 2018-12-06 RX ADMIN — MOMETASONE FUROATE AND FORMOTEROL FUMARATE DIHYDRATE 2 PUFF: 200; 5 AEROSOL RESPIRATORY (INHALATION) at 21:29

## 2018-12-06 RX ADMIN — METHYLPREDNISOLONE SODIUM SUCCINATE 60 MG: 125 INJECTION, POWDER, FOR SOLUTION INTRAMUSCULAR; INTRAVENOUS at 01:51

## 2018-12-06 RX ADMIN — GUAIFENESIN 600 MG: 600 TABLET, EXTENDED RELEASE ORAL at 21:30

## 2018-12-06 RX ADMIN — ASPIRIN 81 MG: 81 TABLET, COATED ORAL at 12:00

## 2018-12-06 RX ADMIN — ALBUTEROL SULFATE 2.5 MG: 2.5 SOLUTION RESPIRATORY (INHALATION) at 19:52

## 2018-12-06 RX ADMIN — Medication 10 ML: at 21:30

## 2018-12-06 RX ADMIN — DILTIAZEM HYDROCHLORIDE 30 MG: 30 TABLET, FILM COATED ORAL at 21:30

## 2018-12-06 RX ADMIN — ENOXAPARIN SODIUM 30 MG: 30 INJECTION SUBCUTANEOUS at 11:58

## 2018-12-06 RX ADMIN — ALBUTEROL SULFATE 2.5 MG: 2.5 SOLUTION RESPIRATORY (INHALATION) at 11:12

## 2018-12-06 RX ADMIN — DILTIAZEM HYDROCHLORIDE 30 MG: 30 TABLET, FILM COATED ORAL at 11:59

## 2018-12-06 RX ADMIN — ESCITALOPRAM OXALATE 20 MG: 20 TABLET ORAL at 11:59

## 2018-12-06 RX ADMIN — SENNOSIDES AND DOCUSATE SODIUM 1 TABLET: 8.6; 5 TABLET ORAL at 21:30

## 2018-12-06 ASSESSMENT — PAIN DESCRIPTION - PAIN TYPE: TYPE: CHRONIC PAIN

## 2018-12-06 ASSESSMENT — PAIN DESCRIPTION - LOCATION: LOCATION: BACK

## 2018-12-06 ASSESSMENT — PAIN SCALES - GENERAL
PAINLEVEL_OUTOF10: 2
PAINLEVEL_OUTOF10: 0
PAINLEVEL_OUTOF10: 4

## 2018-12-06 NOTE — CARE COORDINATION
DISCHARGE PLANNING NOTE:    Follow up appointment made for patient with Dr. Boubacar Saba on 12/11 at 1:10 PM.  Notified patient of date and time. Patient verbalizes understanding. Appointment entered into discharge navigator.     Electronically signed by Evelia Li RN on 12/6/2018 at 12:00 PM

## 2018-12-06 NOTE — PLAN OF CARE
Problem: Falls - Risk of:  Goal: Will remain free from falls  Will remain free from falls   Outcome: Ongoing  Patient has been free from falls this shift. Call light is within reach, side rails up x2, bed in lowest position. Problem: Breathing Pattern - Ineffective:  Goal: Ability to achieve and maintain a regular respiratory rate will improve  Ability to achieve and maintain a regular respiratory rate will improve   Outcome: Ongoing  Ability to achieve and maintain a regular respiratory rate will improve. Problem: Risk for Impaired Skin Integrity  Goal: Tissue integrity - skin and mucous membranes  Structural intactness and normal physiological function of skin and  mucous membranes. Outcome: Ongoing  Skin assessment as charted. Will continue to monitor.

## 2018-12-07 VITALS
OXYGEN SATURATION: 95 % | WEIGHT: 112.88 LBS | SYSTOLIC BLOOD PRESSURE: 144 MMHG | HEART RATE: 89 BPM | BODY MASS INDEX: 17.11 KG/M2 | RESPIRATION RATE: 19 BRPM | TEMPERATURE: 97.4 F | HEIGHT: 68 IN | DIASTOLIC BLOOD PRESSURE: 87 MMHG

## 2018-12-07 LAB
ANION GAP SERPL CALCULATED.3IONS-SCNC: 7 MMOL/L (ref 9–17)
BUN BLDV-MCNC: 18 MG/DL (ref 8–23)
BUN/CREAT BLD: ABNORMAL (ref 9–20)
CALCIUM SERPL-MCNC: 8.5 MG/DL (ref 8.6–10.4)
CHLORIDE BLD-SCNC: 95 MMOL/L (ref 98–107)
CO2: 35 MMOL/L (ref 20–31)
CREAT SERPL-MCNC: 0.49 MG/DL (ref 0.7–1.2)
GFR AFRICAN AMERICAN: >60 ML/MIN
GFR NON-AFRICAN AMERICAN: >60 ML/MIN
GFR SERPL CREATININE-BSD FRML MDRD: ABNORMAL ML/MIN/{1.73_M2}
GFR SERPL CREATININE-BSD FRML MDRD: ABNORMAL ML/MIN/{1.73_M2}
GLUCOSE BLD-MCNC: 130 MG/DL (ref 70–99)
POTASSIUM SERPL-SCNC: 4.2 MMOL/L (ref 3.7–5.3)
SODIUM BLD-SCNC: 137 MMOL/L (ref 135–144)

## 2018-12-07 PROCEDURE — 6360000002 HC RX W HCPCS: Performed by: FAMILY MEDICINE

## 2018-12-07 PROCEDURE — 94640 AIRWAY INHALATION TREATMENT: CPT

## 2018-12-07 PROCEDURE — 6370000000 HC RX 637 (ALT 250 FOR IP): Performed by: INTERNAL MEDICINE

## 2018-12-07 PROCEDURE — 94761 N-INVAS EAR/PLS OXIMETRY MLT: CPT

## 2018-12-07 PROCEDURE — 6360000002 HC RX W HCPCS: Performed by: INTERNAL MEDICINE

## 2018-12-07 PROCEDURE — 80048 BASIC METABOLIC PNL TOTAL CA: CPT

## 2018-12-07 PROCEDURE — 6370000000 HC RX 637 (ALT 250 FOR IP): Performed by: FAMILY MEDICINE

## 2018-12-07 PROCEDURE — 36415 COLL VENOUS BLD VENIPUNCTURE: CPT

## 2018-12-07 PROCEDURE — 2580000003 HC RX 258: Performed by: FAMILY MEDICINE

## 2018-12-07 PROCEDURE — 2700000000 HC OXYGEN THERAPY PER DAY

## 2018-12-07 RX ORDER — GUAIFENESIN 600 MG/1
600 TABLET, EXTENDED RELEASE ORAL 2 TIMES DAILY
Qty: 60 TABLET | Refills: 1 | Status: SHIPPED | OUTPATIENT
Start: 2018-12-07

## 2018-12-07 RX ORDER — SCOLOPAMINE TRANSDERMAL SYSTEM 1 MG/1
1 PATCH, EXTENDED RELEASE TRANSDERMAL
Qty: 10 PATCH | Refills: 0 | Status: SHIPPED | OUTPATIENT
Start: 2018-12-07

## 2018-12-07 RX ADMIN — ASPIRIN 81 MG: 81 TABLET, COATED ORAL at 08:24

## 2018-12-07 RX ADMIN — BUSPIRONE HYDROCHLORIDE 10 MG: 5 TABLET ORAL at 08:24

## 2018-12-07 RX ADMIN — METHYLPREDNISOLONE SODIUM SUCCINATE 60 MG: 125 INJECTION, POWDER, FOR SOLUTION INTRAMUSCULAR; INTRAVENOUS at 09:22

## 2018-12-07 RX ADMIN — DILTIAZEM HYDROCHLORIDE 30 MG: 30 TABLET, FILM COATED ORAL at 05:12

## 2018-12-07 RX ADMIN — ALBUTEROL SULFATE 2.5 MG: 2.5 SOLUTION RESPIRATORY (INHALATION) at 10:57

## 2018-12-07 RX ADMIN — ESCITALOPRAM OXALATE 20 MG: 20 TABLET ORAL at 08:24

## 2018-12-07 RX ADMIN — MORPHINE SULFATE 6 MG: 10 SOLUTION ORAL at 01:53

## 2018-12-07 RX ADMIN — ALBUTEROL SULFATE 2.5 MG: 2.5 SOLUTION RESPIRATORY (INHALATION) at 06:50

## 2018-12-07 RX ADMIN — METOPROLOL SUCCINATE 100 MG: 100 TABLET, EXTENDED RELEASE ORAL at 08:24

## 2018-12-07 RX ADMIN — METHYLPREDNISOLONE SODIUM SUCCINATE 60 MG: 125 INJECTION, POWDER, FOR SOLUTION INTRAMUSCULAR; INTRAVENOUS at 01:50

## 2018-12-07 RX ADMIN — ENOXAPARIN SODIUM 40 MG: 100 INJECTION SUBCUTANEOUS at 08:23

## 2018-12-07 RX ADMIN — MOMETASONE FUROATE AND FORMOTEROL FUMARATE DIHYDRATE 2 PUFF: 200; 5 AEROSOL RESPIRATORY (INHALATION) at 08:25

## 2018-12-07 RX ADMIN — Medication 10 ML: at 08:29

## 2018-12-07 RX ADMIN — GUAIFENESIN 600 MG: 600 TABLET, EXTENDED RELEASE ORAL at 08:24

## 2018-12-07 RX ADMIN — MORPHINE SULFATE 6 MG: 10 SOLUTION ORAL at 08:26

## 2018-12-07 RX ADMIN — TAMSULOSIN HYDROCHLORIDE 0.4 MG: 0.4 CAPSULE ORAL at 08:24

## 2018-12-07 RX ADMIN — TIOTROPIUM BROMIDE 18 MCG: 18 CAPSULE ORAL; RESPIRATORY (INHALATION) at 08:23

## 2018-12-07 ASSESSMENT — PAIN SCALES - GENERAL
PAINLEVEL_OUTOF10: 8
PAINLEVEL_OUTOF10: 4
PAINLEVEL_OUTOF10: 0
PAINLEVEL_OUTOF10: 1
PAINLEVEL_OUTOF10: 0

## 2018-12-07 ASSESSMENT — PAIN DESCRIPTION - PAIN TYPE: TYPE: CHRONIC PAIN

## 2018-12-07 ASSESSMENT — PAIN DESCRIPTION - LOCATION: LOCATION: BACK

## 2018-12-07 NOTE — PROGRESS NOTES
.. PALLIATIVE CARE NURSING ASSESSMENT    Patient: Libra Singleton  Room: 2060/2060-01    Reason For Consult   Goals of care evaluation  Distress management  Guidance and support  Facilitate communications  Assistance in coordinating care      Impression: Libra Singleton is a 61y.o. year old male  has a past medical history of Arthritis; CAD (coronary artery disease); Cataract; COPD (chronic obstructive pulmonary disease) ([facility] Hypertension; Inguinal hernia; Lung cancer [facility]); MI (myocardial infarction) ([facility] Oxygen dependent; SOB (shortness of breath) on exertion; Vascular disease; and Wears glasses. .  Currently hospitalized for the management of *Acute exacerbation of COPD. The Palliative Care Team is following to assist with goals of care and support. Vital Signs  Blood pressure 126/73, pulse 81, temperature 98 °F (36.7 °C), temperature source Oral, resp. rate 16, height 5' 8\" (1.727 m), weight 115 lb 8.3 oz (52.4 kg), SpO2 94 %. Patient Active Problem List   Diagnosis    Malignant neoplasm of lung ([facility]    COPD exacerbation (HCC)    Acute bronchitis    Coronary artery disease involving native coronary artery of native heart without angina pectoris    Maxillary sinus polyp    Pneumothorax after biopsy    Shortness of breath    Benign prostatic hyperplasia with urinary frequency       Palliative Interaction:Patient is alert and talkative , and does get confused easily. I explained that I was palliative care and I see patients in the hospital. He assumed that [address] transferred to hospice care automatically. I did reach out to his wife Pennelope Primrose, and she is very aware and ready for the home hospice information. I did explain to the patient that we would call a meeting with hospice, and then [address] would stop , and hospice would take over the care. I set up a meeting with Hospice [address] [place] [address] for 5 pm today. There is no discharge order in today.  I did update the wife Pennelope Primrose about the
Bedside rounding done, per Noe Rn's  IV , intact, INT'd  NO c/o of pain, or SOB  Bed alarm on  [facility] mattress cont.  On pt's bed
Bedside rounding done, per Sergio Quiñonez Rn's  IV intact, no redness noted  Pt. Has no c/o of pain, o r SOB at this time  O 2  Cont.  On per NC  Bed alarm on
[facility] Progress Note    2018 12:09 PM      Subjective:  Mr. Bassett Scales of ongoing shortness of breath. No anginal chest pain. Hospice was consulted apparently. Wife at bedside. LABS:     Recent Results (from the past 24 hour(s))   Basic Metabolic Panel w/ Reflex to MG    Collection Time: 18  5:48 AM   Result Value Ref Range    Glucose 134 (H) 70 - 99 mg/dL    BUN 24 (H) 8 - 23 mg/dL    CREATININE 0.51 (L) 0.70 - 1.20 mg/dL    Bun/Cre Ratio NOT REPORTED 9 - 20    Calcium 9.2 8.6 - 10.4 mg/dL    Sodium 140 135 - 144 mmol/L    Potassium 4.8 3.7 - 5.3 mmol/L    Chloride 98 98 - 107 mmol/L    CO2 35 (H) 20 - 31 mmol/L    Anion Gap 7 (L) 9 - 17 mmol/L    GFR Non-African American >60 >60 mL/min    GFR African American >60 >60 mL/min    GFR Comment          GFR Staging NOT REPORTED        Pulse Ox: SpO2  Av.3 %  Min: 94 %  Max: 98 %    Supplemental O2: O2 Flow Rate (L/min): 3 L/min     Current Meds:    scopolamine  1 patch Transdermal Q72H    diltiazem  30 mg Oral 3 times per day    aspirin  81 mg Oral Daily    albuterol  2.5 mg Nebulization 4x daily    busPIRone  10 mg Oral Daily    escitalopram  20 mg Oral Daily    mometasone-formoterol  2 puff Inhalation BID    metoprolol succinate  100 mg Oral Daily    sennosides-docusate sodium  1 tablet Oral Nightly    tiotropium  18 mcg Inhalation Daily    tamsulosin  0.4 mg Oral Daily    sodium chloride flush  10 mL Intravenous 2 times per day    enoxaparin  30 mg Subcutaneous Daily    methylPREDNISolone  60 mg Intravenous Q8H    guaiFENesin  600 mg Oral BID          VITAL SIGNS:    /73   Pulse 81   Temp 98 °F (36.7 °C) (Oral)   Resp 16   Ht 5' 8\" (1.727 m)   Wt 115 lb 8.3 oz (52.4 kg)   SpO2 94%   BMI 17.56 kg/m²  3 L/min      Admit Weight:  110 lb (49.9 kg)    Last 3 weights:   Wt Readings from Last 3 Encounters:   18 115 lb 8.3 oz (52.4 kg)   18 129 lb 2 oz (58.6 kg)
HOB up  @  40 degrees  O 2  Cont.  On per NC
HOB up  @  40 degrees  Resp.  Easy  @ 18/min
Helped pt.  To be repositioned in bed
O 2  Cont.  On per NC
Pt cont.  To watch  tv
Pt eats breakfast
Pt eats lunch
Pt eats supper
Pt rests peacefully
Spoke with Dr. Hilario Aguila , regarding pt's tachycardia of 120-130, and up to 140 with the pt.  Urinates  New orders received   Also notified that B/P is 132/62
Spoke with Dr. Rashawn Gagnon advised that family chose home with hospice tomorrow.   Will do paperwork in am.
Wife cont.  To visit at bedside
Regular rate and rhythm. No S3 gallop or rubs. Extremities: no leg edema. ASSESSMENT/PLAN:    Sinus tachycardia due to hypoxemia from chronic respiratory failure with underlying severe pulmonary emphysema requiring oxygen therapy.     Normal LV systolic function with LVEF 65%, mild TR, RVSP 40 mm of mercury on 2D echocardiogram 12/04/2018.       ASCAD, stent of left circumflex artery in 2009, stent of proximal LAD in March 2016, stable angina pectoris.     Hyperlipidemia with intolerance to statins due to myalgias involving legs     Cancer of the lung and status post right upper lobectomy.     Chronic tobacco abuse currently still smoking half pack per day. Improving. Continue current meds. Will follow. Electronically signed by Carmelina Richardson MD, [facility]        PLEASE NOTE:  This progress note was completed using a voice transcription system. Every effort was made to ensure accuracy. However, inadvertent computerized transcription errors may be present.
COPD  Palliative care ongoing at home - consulted as IP as well to assist patient and family.     Shade Goltz, MD  12/5/2018

## 2018-12-07 NOTE — DISCHARGE INSTR - COC
Continuity of Care Form    Patient Name: Tra Styles   :  1955  MRN:  [id number]    Admit date:  12/3/2018  Discharge date:  ***    Code Status Order: [id number]   Advance Directives:   [facility] Documentation     Date/Time Healthcare Directive Type of Healthcare Directive Copy in [address] Agent's Name Healthcare Agent's Phone Number    18  Yes, patient has an advance directive for healthcare treatment  Durable power of  for health care  Yes, copy in chart  --  --  --          Admitting Physician:  Jerry Torres MD  PCP: Jerry Torres MD    Discharging Nurse: [address] [place] [address] Unit/Room#: 0-  Discharging Unit Phone Number: [phone]    Emergency Contact:   Extended Emergency Contact Information  Primary Emergency Contact: Pascale Sorto  Address: [address] Phone: [phone]  Mobile Phone: [phone]  Relation: Spouse  Hearing or visual needs: None  Other needs: None  Preferred language: English   needed? No  Secondary Emergency Contact: Evelin [address] Phone: [phone]  Relation: Child  Hearing or visual needs: None  Other needs: None  Preferred language: English   needed? No    Past Surgical History:  Past Surgical History:   Procedure Laterality Date    BACK SURGERY  cervical fusion    CATARACT REMOVAL      Rt. eye    COLONOSCOPY      CORONARY ANGIOPLASTY WITH STENT PLACEMENT      had stent x1 before  as well. ( total of 2 stents)    EYE SURGERY      rt. cataract extracted with IOL    INGUINAL HERNIA REPAIR  2012    INGUINAL HERNIA REPAIR Right 14    Dr Vivar Comes Right 14    excision of anal lesion    LOBECTOMY  12    RUL robotic    NECK SURGERY      OTHER SURGICAL HISTORY      neck fusion    UPPER SIGNATURE:  Electronically signed by Claudell Sheehan, MD on 12/7/18 at 8:14 AM

## 2018-12-17 NOTE — DISCHARGE SUMMARY
Family Medicine Discharge Summary    Tra Styles  :  1955  MRN:  [id number]    Admit date:  12/3/2018  Discharge date:  2018    Admitting Physician:  Jerry Torres MD    Discharge Diagnoses:    Patient Active Problem List   Diagnosis    [facility]    COPD exacerbation ([facility]    Acute bronchitis    Coronary artery disease involving native coronary artery of native heart without angina pectoris    Maxillary sinus polyp    Pneumothorax after biopsy    Shortness of breath    Benign prostatic hyperplasia with urinary frequency       Admission Condition:  poor  Discharged Condition:  stable    Hospital Course:   60 yo under palliative care at home for heart disease and lung cancer is admitted for increasing leg edema and shortness of breath. He was placed on steroids, bronchodilators, and antibiotics and was diuresed. After discussion with the patient and his wife, the decision was made to discharge to hospice care at home. Discharge Medications:       Bebeto Hart   Home Medication Instructions [id number]    Printed on:18   Medication Information                      albuterol (PROVENTIL) (2.5 MG/3ML) 0.083% nebulizer solution  Take by nebulization daily             ASPIR-LOW 81 MG EC tablet  Take 1 tablet by mouth daily             busPIRone (BUSPAR) 10 MG tablet  Take 10 mg by mouth daily             diltiazem (CARDIZEM) 30 MG tablet  Take 1 tablet by mouth every 8 hours             escitalopram (LEXAPRO) 10 MG tablet  Take 1 tablet by mouth daily             Fluticasone Furoate-Vilanterol (BREO ELLIPTA) 100-25 MCG/INH AEPB  Inhale 1 Dose into the lungs daily for 30 days. guaiFENesin (MUCINEX) 600 MG extended release tablet  Take 1 tablet by mouth 2 times daily             metoprolol (TOPROL-XL) 100 MG XL tablet  Take 100 mg by mouth daily              morphine 10 MG/5ML solution  Take by mouth every 2 hours as needed for Pain. Zoë Molina